# Patient Record
Sex: MALE | Race: WHITE | Employment: UNEMPLOYED | URBAN - METROPOLITAN AREA
[De-identification: names, ages, dates, MRNs, and addresses within clinical notes are randomized per-mention and may not be internally consistent; named-entity substitution may affect disease eponyms.]

---

## 2020-03-26 ENCOUNTER — APPOINTMENT (OUTPATIENT)
Dept: CT IMAGING | Age: 35
DRG: 062 | End: 2020-03-26
Payer: COMMERCIAL

## 2020-03-26 ENCOUNTER — HOSPITAL ENCOUNTER (INPATIENT)
Age: 35
LOS: 1 days | Discharge: HOME | DRG: 062 | End: 2020-03-27
Attending: EMERGENCY MEDICINE | Admitting: INTERNAL MEDICINE
Payer: COMMERCIAL

## 2020-03-26 ENCOUNTER — APPOINTMENT (OUTPATIENT)
Dept: MRI IMAGING | Age: 35
DRG: 062 | End: 2020-03-26
Payer: COMMERCIAL

## 2020-03-26 PROBLEM — I63.9 STROKE ABORTED BY ADMINISTRATION OF THROMBOLYTIC AGENT (HCC): Status: ACTIVE | Noted: 2020-03-26

## 2020-03-26 LAB
ALBUMIN SERPL-MCNC: 4.6 G/DL (ref 3.4–5)
ALBUMIN SERPL-MCNC: 4.8 G/DL (ref 3.4–5)
ALP BLD-CCNC: 92 U/L (ref 40–129)
ALT SERPL-CCNC: 78 U/L (ref 10–40)
AMPHETAMINE SCREEN, URINE: POSITIVE
ANION GAP SERPL CALCULATED.3IONS-SCNC: 18 MMOL/L (ref 3–16)
ANION GAP SERPL CALCULATED.3IONS-SCNC: 21 MMOL/L (ref 3–16)
AST SERPL-CCNC: 69 U/L (ref 15–37)
BARBITURATE SCREEN URINE: ABNORMAL
BASOPHILS ABSOLUTE: 0.1 K/UL (ref 0–0.2)
BASOPHILS RELATIVE PERCENT: 0.8 %
BENZODIAZEPINE SCREEN, URINE: ABNORMAL
BILIRUB SERPL-MCNC: 0.8 MG/DL (ref 0–1)
BILIRUBIN DIRECT: <0.2 MG/DL (ref 0–0.3)
BILIRUBIN, INDIRECT: ABNORMAL MG/DL (ref 0–1)
BUN BLDV-MCNC: 10 MG/DL (ref 7–20)
BUN BLDV-MCNC: 10 MG/DL (ref 7–20)
CALCIUM SERPL-MCNC: 9.5 MG/DL (ref 8.3–10.6)
CALCIUM SERPL-MCNC: 9.9 MG/DL (ref 8.3–10.6)
CANNABINOID SCREEN URINE: ABNORMAL
CHLORIDE BLD-SCNC: 101 MMOL/L (ref 99–110)
CHLORIDE BLD-SCNC: 104 MMOL/L (ref 99–110)
CHOLESTEROL, TOTAL: 139 MG/DL (ref 0–199)
CO2: 16 MMOL/L (ref 21–32)
CO2: 19 MMOL/L (ref 21–32)
COCAINE METABOLITE SCREEN URINE: ABNORMAL
CREAT SERPL-MCNC: 0.9 MG/DL (ref 0.9–1.3)
CREAT SERPL-MCNC: 0.9 MG/DL (ref 0.9–1.3)
EKG ATRIAL RATE: 187 BPM
EKG DIAGNOSIS: NORMAL
EKG Q-T INTERVAL: 252 MS
EKG QRS DURATION: 70 MS
EKG QTC CALCULATION (BAZETT): 439 MS
EKG R AXIS: 81 DEGREES
EKG T AXIS: 58 DEGREES
EKG VENTRICULAR RATE: 183 BPM
EOSINOPHILS ABSOLUTE: 0.1 K/UL (ref 0–0.6)
EOSINOPHILS RELATIVE PERCENT: 1.2 %
ESTIMATED AVERAGE GLUCOSE: 91.1 MG/DL
ETHANOL: 99 MG/DL (ref 0–0.08)
GFR AFRICAN AMERICAN: >60
GFR AFRICAN AMERICAN: >60
GFR NON-AFRICAN AMERICAN: >60
GFR NON-AFRICAN AMERICAN: >60
GLUCOSE BLD-MCNC: 94 MG/DL (ref 70–99)
GLUCOSE BLD-MCNC: 95 MG/DL (ref 70–99)
GLUCOSE BLD-MCNC: 95 MG/DL (ref 70–99)
HBA1C MFR BLD: 4.8 %
HCT VFR BLD CALC: 50.6 % (ref 40.5–52.5)
HDLC SERPL-MCNC: 42 MG/DL (ref 40–60)
HEMOGLOBIN: 17.2 G/DL (ref 13.5–17.5)
INR BLD: 1.2 (ref 0.86–1.14)
LDL CHOLESTEROL CALCULATED: 79 MG/DL
LV EF: 50 %
LVEF MODALITY: NORMAL
LYMPHOCYTES ABSOLUTE: 2.6 K/UL (ref 1–5.1)
LYMPHOCYTES RELATIVE PERCENT: 27.4 %
Lab: ABNORMAL
MCH RBC QN AUTO: 35.1 PG (ref 26–34)
MCHC RBC AUTO-ENTMCNC: 34.1 G/DL (ref 31–36)
MCV RBC AUTO: 102.9 FL (ref 80–100)
METHADONE SCREEN, URINE: ABNORMAL
MONOCYTES ABSOLUTE: 0.8 K/UL (ref 0–1.3)
MONOCYTES RELATIVE PERCENT: 8.3 %
NEUTROPHILS ABSOLUTE: 5.8 K/UL (ref 1.7–7.7)
NEUTROPHILS RELATIVE PERCENT: 62.3 %
OPIATE SCREEN URINE: ABNORMAL
OXYCODONE URINE: ABNORMAL
PDW BLD-RTO: 12.5 % (ref 12.4–15.4)
PERFORMED ON: NORMAL
PH UA: 5
PHENCYCLIDINE SCREEN URINE: ABNORMAL
PHOSPHORUS: 4.2 MG/DL (ref 2.5–4.9)
PLATELET # BLD: 249 K/UL (ref 135–450)
PMV BLD AUTO: 8.6 FL (ref 5–10.5)
POTASSIUM SERPL-SCNC: 3.8 MMOL/L (ref 3.5–5.1)
POTASSIUM SERPL-SCNC: 4.2 MMOL/L (ref 3.5–5.1)
PROPOXYPHENE SCREEN: ABNORMAL
PROTHROMBIN TIME: 13.9 SEC (ref 10–13.2)
RBC # BLD: 4.92 M/UL (ref 4.2–5.9)
SODIUM BLD-SCNC: 138 MMOL/L (ref 136–145)
SODIUM BLD-SCNC: 141 MMOL/L (ref 136–145)
TOTAL PROTEIN: 7.3 G/DL (ref 6.4–8.2)
TRIGL SERPL-MCNC: 90 MG/DL (ref 0–150)
TROPONIN: <0.01 NG/ML
TSH REFLEX: 1.75 UIU/ML (ref 0.27–4.2)
VLDLC SERPL CALC-MCNC: 18 MG/DL
WBC # BLD: 9.3 K/UL (ref 4–11)

## 2020-03-26 PROCEDURE — 6360000002 HC RX W HCPCS: Performed by: EMERGENCY MEDICINE

## 2020-03-26 PROCEDURE — 92610 EVALUATE SWALLOWING FUNCTION: CPT

## 2020-03-26 PROCEDURE — 99254 IP/OBS CNSLTJ NEW/EST MOD 60: CPT | Performed by: INTERNAL MEDICINE

## 2020-03-26 PROCEDURE — 6360000002 HC RX W HCPCS: Performed by: STUDENT IN AN ORGANIZED HEALTH CARE EDUCATION/TRAINING PROGRAM

## 2020-03-26 PROCEDURE — 6360000004 HC RX CONTRAST MEDICATION: Performed by: EMERGENCY MEDICINE

## 2020-03-26 PROCEDURE — 94150 VITAL CAPACITY TEST: CPT

## 2020-03-26 PROCEDURE — 85610 PROTHROMBIN TIME: CPT

## 2020-03-26 PROCEDURE — 84484 ASSAY OF TROPONIN QUANT: CPT

## 2020-03-26 PROCEDURE — 84443 ASSAY THYROID STIM HORMONE: CPT

## 2020-03-26 PROCEDURE — 3E03317 INTRODUCTION OF OTHER THROMBOLYTIC INTO PERIPHERAL VEIN, PERCUTANEOUS APPROACH: ICD-10-PCS | Performed by: STUDENT IN AN ORGANIZED HEALTH CARE EDUCATION/TRAINING PROGRAM

## 2020-03-26 PROCEDURE — 92523 SPEECH SOUND LANG COMPREHEN: CPT

## 2020-03-26 PROCEDURE — 70551 MRI BRAIN STEM W/O DYE: CPT

## 2020-03-26 PROCEDURE — 6360000002 HC RX W HCPCS

## 2020-03-26 PROCEDURE — 2580000003 HC RX 258: Performed by: EMERGENCY MEDICINE

## 2020-03-26 PROCEDURE — 2000000000 HC ICU R&B

## 2020-03-26 PROCEDURE — 70496 CT ANGIOGRAPHY HEAD: CPT

## 2020-03-26 PROCEDURE — G0480 DRUG TEST DEF 1-7 CLASSES: HCPCS

## 2020-03-26 PROCEDURE — 99285 EMERGENCY DEPT VISIT HI MDM: CPT

## 2020-03-26 PROCEDURE — 80307 DRUG TEST PRSMV CHEM ANLYZR: CPT

## 2020-03-26 PROCEDURE — 2580000003 HC RX 258: Performed by: STUDENT IN AN ORGANIZED HEALTH CARE EDUCATION/TRAINING PROGRAM

## 2020-03-26 PROCEDURE — 80069 RENAL FUNCTION PANEL: CPT

## 2020-03-26 PROCEDURE — 6370000000 HC RX 637 (ALT 250 FOR IP): Performed by: INTERNAL MEDICINE

## 2020-03-26 PROCEDURE — 6370000000 HC RX 637 (ALT 250 FOR IP): Performed by: STUDENT IN AN ORGANIZED HEALTH CARE EDUCATION/TRAINING PROGRAM

## 2020-03-26 PROCEDURE — 93005 ELECTROCARDIOGRAM TRACING: CPT | Performed by: EMERGENCY MEDICINE

## 2020-03-26 PROCEDURE — 36415 COLL VENOUS BLD VENIPUNCTURE: CPT

## 2020-03-26 PROCEDURE — 2500000003 HC RX 250 WO HCPCS: Performed by: EMERGENCY MEDICINE

## 2020-03-26 PROCEDURE — C8929 TTE W OR WO FOL WCON,DOPPLER: HCPCS

## 2020-03-26 PROCEDURE — 99223 1ST HOSP IP/OBS HIGH 75: CPT | Performed by: INTERNAL MEDICINE

## 2020-03-26 PROCEDURE — 96375 TX/PRO/DX INJ NEW DRUG ADDON: CPT

## 2020-03-26 PROCEDURE — 83036 HEMOGLOBIN GLYCOSYLATED A1C: CPT

## 2020-03-26 PROCEDURE — 80076 HEPATIC FUNCTION PANEL: CPT

## 2020-03-26 PROCEDURE — 80061 LIPID PANEL: CPT

## 2020-03-26 PROCEDURE — 96365 THER/PROPH/DIAG IV INF INIT: CPT

## 2020-03-26 PROCEDURE — 70450 CT HEAD/BRAIN W/O DYE: CPT

## 2020-03-26 PROCEDURE — 2500000003 HC RX 250 WO HCPCS: Performed by: STUDENT IN AN ORGANIZED HEALTH CARE EDUCATION/TRAINING PROGRAM

## 2020-03-26 PROCEDURE — 85025 COMPLETE CBC W/AUTO DIFF WBC: CPT

## 2020-03-26 RX ORDER — SODIUM CHLORIDE 0.9 % (FLUSH) 0.9 %
10 SYRINGE (ML) INJECTION PRN
Status: DISCONTINUED | OUTPATIENT
Start: 2020-03-26 | End: 2020-03-27 | Stop reason: SDUPTHER

## 2020-03-26 RX ORDER — ONDANSETRON 2 MG/ML
4 INJECTION INTRAMUSCULAR; INTRAVENOUS EVERY 6 HOURS PRN
Status: DISCONTINUED | OUTPATIENT
Start: 2020-03-26 | End: 2020-03-27 | Stop reason: HOSPADM

## 2020-03-26 RX ORDER — POLYETHYLENE GLYCOL 3350 17 G/17G
17 POWDER, FOR SOLUTION ORAL DAILY PRN
Status: DISCONTINUED | OUTPATIENT
Start: 2020-03-26 | End: 2020-03-27 | Stop reason: HOSPADM

## 2020-03-26 RX ORDER — SODIUM CHLORIDE 0.9 % (FLUSH) 0.9 %
10 SYRINGE (ML) INJECTION PRN
Status: DISCONTINUED | OUTPATIENT
Start: 2020-03-26 | End: 2020-03-26 | Stop reason: SDUPTHER

## 2020-03-26 RX ORDER — ASPIRIN 81 MG/1
81 TABLET ORAL DAILY
Status: DISCONTINUED | OUTPATIENT
Start: 2020-03-27 | End: 2020-03-27 | Stop reason: HOSPADM

## 2020-03-26 RX ORDER — SODIUM CHLORIDE 0.9 % (FLUSH) 0.9 %
10 SYRINGE (ML) INJECTION EVERY 12 HOURS SCHEDULED
Status: DISCONTINUED | OUTPATIENT
Start: 2020-03-26 | End: 2020-03-26 | Stop reason: SDUPTHER

## 2020-03-26 RX ORDER — DILTIAZEM HYDROCHLORIDE 120 MG/1
240 CAPSULE, COATED, EXTENDED RELEASE ORAL DAILY
Status: DISCONTINUED | OUTPATIENT
Start: 2020-03-26 | End: 2020-03-27

## 2020-03-26 RX ORDER — ACETAMINOPHEN 325 MG/1
650 TABLET ORAL EVERY 8 HOURS PRN
Status: DISCONTINUED | OUTPATIENT
Start: 2020-03-26 | End: 2020-03-27

## 2020-03-26 RX ORDER — ATORVASTATIN CALCIUM 80 MG/1
80 TABLET, FILM COATED ORAL NIGHTLY
Status: DISCONTINUED | OUTPATIENT
Start: 2020-03-26 | End: 2020-03-27 | Stop reason: HOSPADM

## 2020-03-26 RX ORDER — ESCITALOPRAM OXALATE 20 MG/1
20 TABLET ORAL DAILY
COMMUNITY

## 2020-03-26 RX ORDER — DILTIAZEM HYDROCHLORIDE 5 MG/ML
10 INJECTION INTRAVENOUS ONCE
Status: COMPLETED | OUTPATIENT
Start: 2020-03-26 | End: 2020-03-26

## 2020-03-26 RX ORDER — PROMETHAZINE HYDROCHLORIDE 25 MG/1
12.5 TABLET ORAL EVERY 6 HOURS PRN
Status: DISCONTINUED | OUTPATIENT
Start: 2020-03-26 | End: 2020-03-27 | Stop reason: HOSPADM

## 2020-03-26 RX ORDER — ASPIRIN 300 MG/1
300 SUPPOSITORY RECTAL DAILY
Status: DISCONTINUED | OUTPATIENT
Start: 2020-03-27 | End: 2020-03-27

## 2020-03-26 RX ORDER — DEXTROSE MONOHYDRATE 25 G/50ML
12.5 INJECTION, SOLUTION INTRAVENOUS
Status: ACTIVE | OUTPATIENT
Start: 2020-03-26 | End: 2020-03-26

## 2020-03-26 RX ORDER — DEXTROAMPHETAMINE SACCHARATE, AMPHETAMINE ASPARTATE, DEXTROAMPHETAMINE SULFATE AND AMPHETAMINE SULFATE 3.75; 3.75; 3.75; 3.75 MG/1; MG/1; MG/1; MG/1
15 TABLET ORAL 2 TIMES DAILY
Status: ON HOLD | COMMUNITY
End: 2020-03-27 | Stop reason: HOSPADM

## 2020-03-26 RX ORDER — SODIUM CHLORIDE 0.9 % (FLUSH) 0.9 %
10 SYRINGE (ML) INJECTION EVERY 12 HOURS SCHEDULED
Status: DISCONTINUED | OUTPATIENT
Start: 2020-03-26 | End: 2020-03-27 | Stop reason: SDUPTHER

## 2020-03-26 RX ORDER — SODIUM CHLORIDE, SODIUM LACTATE, POTASSIUM CHLORIDE, CALCIUM CHLORIDE 600; 310; 30; 20 MG/100ML; MG/100ML; MG/100ML; MG/100ML
INJECTION, SOLUTION INTRAVENOUS CONTINUOUS
Status: DISCONTINUED | OUTPATIENT
Start: 2020-03-26 | End: 2020-03-26

## 2020-03-26 RX ORDER — 0.9 % SODIUM CHLORIDE 0.9 %
50 INTRAVENOUS SOLUTION INTRAVENOUS ONCE
Status: COMPLETED | OUTPATIENT
Start: 2020-03-26 | End: 2020-03-26

## 2020-03-26 RX ORDER — DEXTROAMPHETAMINE SACCHARATE, AMPHETAMINE ASPARTATE, DEXTROAMPHETAMINE SULFATE AND AMPHETAMINE SULFATE 3.75; 3.75; 3.75; 3.75 MG/1; MG/1; MG/1; MG/1
15 TABLET ORAL 2 TIMES DAILY
Status: DISCONTINUED | OUTPATIENT
Start: 2020-03-26 | End: 2020-03-26

## 2020-03-26 RX ORDER — 0.9 % SODIUM CHLORIDE 0.9 %
1000 INTRAVENOUS SOLUTION INTRAVENOUS ONCE
Status: COMPLETED | OUTPATIENT
Start: 2020-03-26 | End: 2020-03-26

## 2020-03-26 RX ORDER — ESCITALOPRAM OXALATE 20 MG/1
20 TABLET ORAL DAILY
Status: DISCONTINUED | OUTPATIENT
Start: 2020-03-26 | End: 2020-03-27 | Stop reason: HOSPADM

## 2020-03-26 RX ADMIN — ALTEPLASE 69.8 MG: KIT at 01:11

## 2020-03-26 RX ADMIN — ATORVASTATIN CALCIUM 80 MG: 80 TABLET, FILM COATED ORAL at 21:16

## 2020-03-26 RX ADMIN — DILTIAZEM HYDROCHLORIDE 5 MG/HR: 5 INJECTION INTRAVENOUS at 01:38

## 2020-03-26 RX ADMIN — DILTIAZEM HYDROCHLORIDE 240 MG: 120 CAPSULE, COATED, EXTENDED RELEASE ORAL at 16:11

## 2020-03-26 RX ADMIN — DILTIAZEM HYDROCHLORIDE 10 MG/HR: 5 INJECTION INTRAVENOUS at 05:49

## 2020-03-26 RX ADMIN — SODIUM CHLORIDE 50 ML: 900 INJECTION, SOLUTION INTRAVENOUS at 02:03

## 2020-03-26 RX ADMIN — ESCITALOPRAM OXALATE 20 MG: 20 TABLET ORAL at 10:22

## 2020-03-26 RX ADMIN — SODIUM CHLORIDE, POTASSIUM CHLORIDE, SODIUM LACTATE AND CALCIUM CHLORIDE: 600; 310; 30; 20 INJECTION, SOLUTION INTRAVENOUS at 05:49

## 2020-03-26 RX ADMIN — SODIUM CHLORIDE 1000 ML: 0.9 INJECTION, SOLUTION INTRAVENOUS at 00:45

## 2020-03-26 RX ADMIN — IOPAMIDOL 80 ML: 755 INJECTION, SOLUTION INTRAVENOUS at 00:36

## 2020-03-26 RX ADMIN — Medication 10 ML: at 10:22

## 2020-03-26 RX ADMIN — Medication 10 ML: at 21:16

## 2020-03-26 RX ADMIN — DILTIAZEM HYDROCHLORIDE 15 MG/HR: 5 INJECTION INTRAVENOUS at 14:15

## 2020-03-26 RX ADMIN — ALTEPLASE 7.8 MG: KIT at 01:11

## 2020-03-26 RX ADMIN — FOLIC ACID: 5 INJECTION, SOLUTION INTRAMUSCULAR; INTRAVENOUS; SUBCUTANEOUS at 05:48

## 2020-03-26 RX ADMIN — ACETAMINOPHEN 650 MG: 325 TABLET ORAL at 18:56

## 2020-03-26 RX ADMIN — DILTIAZEM HYDROCHLORIDE 10 MG: 5 INJECTION INTRAVENOUS at 00:44

## 2020-03-26 ASSESSMENT — PAIN DESCRIPTION - PAIN TYPE
TYPE: ACUTE PAIN
TYPE: ACUTE PAIN

## 2020-03-26 ASSESSMENT — ENCOUNTER SYMPTOMS
EYES NEGATIVE: 1
GASTROINTESTINAL NEGATIVE: 1
RESPIRATORY NEGATIVE: 1
ALLERGIC/IMMUNOLOGIC NEGATIVE: 1

## 2020-03-26 ASSESSMENT — PAIN SCALES - GENERAL
PAINLEVEL_OUTOF10: 3
PAINLEVEL_OUTOF10: 0
PAINLEVEL_OUTOF10: 3

## 2020-03-26 ASSESSMENT — PAIN DESCRIPTION - ONSET: ONSET: GRADUAL

## 2020-03-26 ASSESSMENT — PAIN DESCRIPTION - LOCATION
LOCATION: HEAD
LOCATION: SACRUM

## 2020-03-26 ASSESSMENT — PAIN - FUNCTIONAL ASSESSMENT: PAIN_FUNCTIONAL_ASSESSMENT: ACTIVITIES ARE NOT PREVENTED

## 2020-03-26 ASSESSMENT — PAIN DESCRIPTION - FREQUENCY: FREQUENCY: INTERMITTENT

## 2020-03-26 ASSESSMENT — PAIN DESCRIPTION - DESCRIPTORS: DESCRIPTORS: ACHING;DULL;DISCOMFORT

## 2020-03-26 ASSESSMENT — PAIN DESCRIPTION - PROGRESSION: CLINICAL_PROGRESSION: NOT CHANGED

## 2020-03-26 ASSESSMENT — PAIN DESCRIPTION - ORIENTATION: ORIENTATION: MID;POSTERIOR

## 2020-03-26 NOTE — ED PROVIDER NOTES
(cerebrovascular accident) ([facility] [address]    2. Expressive aphasia    3. Atrial fibrillation with rapid ventricular response (HCC)        Disposition     PATIENT REFERRED TO:  No follow-up provider specified.     DISCHARGE MEDICATIONS:  New Prescriptions    No medications on file       Fabrizio Wilson MD  03/26/20 8586

## 2020-03-26 NOTE — ED NOTES
Patient reports last seen normal is 2300, patient was still able to text significant other and family, ambulate to room without difficultly and perform other tasks without deficit.       April López RN  03/26/20 1657

## 2020-03-26 NOTE — PROGRESS NOTES
Speech Language Pathology  Facility/Department: [facility]  Initial Speech/Language/Cognitive Assessment    NAME: Alissa Jim  : 1985   MRN: [id number]  ADMISSION DATE: 3/26/2020  ADMITTING DIAGNOSIS: has Stroke aborted by administration of thrombolytic agent ([address] on their problem list.  DATE ONSET: 3/26/2020    Date of Eval: 3/26/2020   Evaluating Therapist: AIDAN Angeles    CT of head 3/26/2020  No acute intracranial findings.     There are areas of scattered ovoid lobulated sclerotic lesions in the    calvarium most prominently right occipital bone measuring 1.7 cm which    are indeterminate.  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     Osteoblastic metastatic disease is difficult to exclude.          Primary Complaint: pt indicates frustration regarding his speech    Pain:  Pain Assessment  Pain Assessment: denies    Assessment:  pt presenting with mod-sev apraxia of speech. Comprehension is intact. When attempting to complete diadokinetic tasks,  rote speech tasks, naming objects or completing sentences pt exhibits difficulty initiating speech with inconsistent articulatory errors. Reading comprehension is WFL's. Pt left hand dominant and is using writing effectively to communicate. As writing is fairly functional, less likely that this is aphasia as language deficits are not apparent in written material.       Recommendations:  Requires SLP Intervention: Yes  Duration/Frequency of Treatment: 3-5 x/week  D/C Recommendations: (ongoing speech/language therapy recommended)     Plan:   Goals:  Pt to be seen to address the following goals:  1-  Pt will utilize 10 high-frequency words via verbal or nonverbal modalities with 60% consistency to improve ability to communicate basic needs with familiar and unfamiliar speakers.   2- pt will repeat single words with 80% accuracy and mod cues  3- pt will participate in further speech/lang/cog assessment as approrpiate  Patient/family involved in developing goals and treatment plan:     Subjective:   Previous level of function and limitations:   General  Chart Reviewed: Yes  Family / Caregiver Present: No     Vision  Vision: Within Functional Limits - pt writes, last night it was blurry, now it is fine  Hearing  Hearing: Within functional limits         Objective:     Oral/Motor  Oral Motor: Within functional limits    Auditory Comprehension  Comprehension: Within Functional Limits    Expression:   Pt exhibiting difficulty communicating verbally however appears to be related to apraxia vs aphasia. Motor Speech  Motor Speech: Exceptions to [facility]  Apraxia: Moderate    Reading:  reading comprehension intact, reading orally affected by apraxia    Writing:  Pt is left hand dominant. Pt using writing at this time to communicate. As writing is fairly functional, less likely that this is aphasia as language deficits are not apparent in written material.     Cognition:   Pt is oriented fully. Cognition appears intact through informal measure, however not able to formally assess due to communication impairment       Prognosis:  Speech Therapy Prognosis  Prognosis: Fair  Individuals consulted  Consulted and agree with results and recommendations: Patient;RN    Education:  Patient Education: pt educated  to purpose of visit  Patient Education Response: Verbalizes understanding  Safety Devices in place: Yes  Type of devices: Call light within reach    Therapy Time:   Individual Concurrent Group Co-treatment   Time In 0852         Time Out 0910         Minutes 18               Speech/language therapy 3-5 x/week  Dc recommendation: ongoing treatment is indicated upon dc from acute services      Timmy Denton M.S./[facility] #[id number]  Pg.  # [id number]  Needs met prior to leaving room, call light within reach  This document will serve as a dc summary if pt dc prior to next visit    3/26/2020 9:52 AM

## 2020-03-26 NOTE — H&P
Internal Medicine PGY- 2 Resident History & Physical intensive care unit      PCP: No primary care provider on file. Date of Admission: 3/26/2020    Chief Complaint: Expressive aphasia    HPI:     Patient is a 50-year-old male with past medical history of alcoholism. Presented to the emergency department for difficulty speaking that started approximately between 7pm - 11 pm before presentation. Prior to presentation to the emergency department Mike Bejarano arrived home from work and found him struggling with communicating. Otherwise there was no difficulty with movement of his extremities or sensation. There was no loss of consciousness there were no history of trauma. He was however drinking prior to presentation unsure of the quantity. He does have a history of chronic alcoholism for which the exact scale is uncertain but significant other reports three quarters to a bottle of hard liquor daily. On admission his alcohol level was 95. Otherwise he does have a history of increased heart rate and palpitations over the last couple of weeks. On talking with the girlfriend she reports that he has been feeling increased fatigue and shortness of breath with minimal activity. She does endorse that he has a very sedentary lifestyle. No recent report of travel. No known history of DVT or thromboembolism in family. No history of early cardiac death reported. Otherwise she reports the only other symptom he was having was left inguinal lymphadenopathy with resolution. History obtained from chart review and domestic partner. No reports of nausea, vomiting, abdominal pain, penile discharge, fever or chills. Sleep apnea reported during sleep study.      Past Medical History:          Diagnosis Date    Alcoholism [facility])     Atrial fibrillation ([facility] [address]        PastSurgical History:      None reported    Medications Prior to Admission:      Adderall 15 bid  Citalopram 20 mg      Allergies: Patient has no known allergies. Social History:      TOBACCO:  Half a pack a day  ETOH: Chronic history of alcohol use indeterminate amount      Family History:     No sudden cardiac death  Mother had stroke in late 45s     PHYSICAL EXAM PERFORMED:    BP (!) 124/98   Pulse 112   Temp 97.8 °F (36.6 °C) (Oral)   Resp 29   Wt 190 lb (86.2 kg)   SpO2 96%     Physical Exam  Constitutional:       General: He is not in acute distress. Appearance: He is well-developed. He is ill-appearing. HENT:      Head: Normocephalic and atraumatic. Mouth/Throat:      Mouth: Mucous membranes are dry. Eyes:      Pupils: Pupils are equal, round, and reactive to light. Neck:      Musculoskeletal: Normal range of motion and neck supple. Cardiovascular:      Rate and Rhythm: Normal rate and regular rhythm. Heart sounds: Normal heart sounds. No murmur. No friction rub. No gallop. Pulmonary:      Effort: Pulmonary effort is normal. No respiratory distress. Breath sounds: Normal breath sounds. Abdominal:      General: Bowel sounds are normal.      Palpations: Abdomen is soft. Musculoskeletal: Normal range of motion. General: No tenderness. Neurological:      Mental Status: He is alert. Cranial Nerves: No cranial nerve deficit. Sensory: No sensory deficit. Motor: No weakness. Coordination: Coordination normal.      Gait: Gait normal.      Deep Tendon Reflexes: Reflexes normal.      Comments: Expressive aphasia normal strength in all 4 extremities. Able to communicate with writing. Labs:     Recent Labs     03/26/20 0014   WBC 9.3   HGB 17.2   HCT 50.6        Recent Labs     03/26/20 0014      K 3.8      CO2 19*   BUN 10   CREATININE 0.9   CALCIUM 9.5     No results for input(s): AST, ALT, BILIDIR, BILITOT, ALKPHOS in the last 72 hours. No results for input(s): INR in the last 72 hours.   Recent Labs     03/26/20 0014   TROPONINI <0.01       Urinalysis:    No results

## 2020-03-26 NOTE — CONSULTS
Clinical Pharmacy Consult Note    27 y.o. male admitted with cerebrovascular accident. Pharmacy has been asked by Dr. Benito Kay to adjust all drips to normal saline as appropriate based on compatibility to avoid fluid shifts since D5 is osmotically active. The following intermittent IV drips/infusions have been adjusted to saline:  Diltiazem IV     The following medications must remain in D5W due to incompatibility with normal saline:  Amphotericin  Epinephrine  Mycophenolate  Nitroprusside  Penicillin G Potassium    Please be aware that patient has D5W ordered as part of hypoglycemia orderset. [facility] will follow daily to ensure all new IVPBs + drips are in NS. Please call with questions. Please call pharmacy with any questions!    [place], [address]

## 2020-03-26 NOTE — CONSULTS
[address]   Cardiac Electrophysiology Consultation   Date: 3/26/2020  Admit Date:  3/26/2020  Reason for Consultation: Atrial fibrillation  Consult Requesting Physician: Tigist Copeland MD     Chief Complaint   Patient presents with    Cerebrovascular Accident     HPI: Sam Smyth is a 27 y.o. gentleman with no past medical history apart from alcoholism, was admitted to the hospital secondary to expressive aphasia. Due to concerns of stroke, patient received TPA. He was also noted to have atrial fibrillation with poorly controlled ventricular rate. Hence EP was consulted for further evaluation. Patient endorses having palpitations, shortness of breath and occasional lightheadedness for the past 3 weeks or so. He denies any history of chest pain. He denies any known history of paroxysmal atrial fibrillation. [place] any recreational drug use. Past Medical History:   Diagnosis Date    Alcoholism [facility])     Atrial fibrillation ([facility] [address]         No past surgical history on file. No Known Allergies    Social History:  Reviewed. Family History:  Reviewed. family history is not on file. No premature CAD. Review of System:  All other systems reviewed except for that noted above. Pertinent negatives and positives are:     Objective      · General: negative for fever, chills   · Ophthalmic ROS: negative for - eye pain or loss of vision  · ENT ROS: negative for - headaches, sore throat   · Respiratory: negative for - cough, sputum  · Cardiovascular: Reviewed in HPI  · Gastrointestinal: negative for - abdominal pain, diarrhea, N/V  · Hematology: negative for - bleeding, blood clots, bruising or jaundice  · Genito-Urinary:  negative for - Dysuria or incontinence  · Musculoskeletal: negative for - Joint swelling, muscle pain  · Neurological: negative for - confusion, dizziness, headaches   · Psychiatric: No anxiety, no depression.   · Dermatological: negative for - rash    Physical Examination:  Vitals:    20 1300   BP: 121/86   Pulse: 97   Resp: 15   Temp:    SpO2: 91%        Intake/Output Summary (Last 24 hours) at 3/26/2020 1434  Last data filed at 3/26/2020 1208  Gross per 24 hour   Intake 626 ml   Output 2350 ml   Net -1724 ml     In: 626 [P.O.:480; I.V.:146]  Out: 2350    Wt Readings from Last 3 Encounters:   20 189 lb 2.5 oz (85.8 kg)     Temp  Av.9 °F (36.6 °C)  Min: 97.7 °F (36.5 °C)  Max: 98 °F (36.7 °C)  Pulse  Av.8  Min: 70  Max: 193  BP  Min: 100/73  Max: 138/110  SpO2  Av.1 %  Min: 91 %  Max: 100 %    · Telemetry: Atrial fibrillation  · Constitutional: Alert. Oriented to person, place, and time. No distress. · Head: Normocephalic and atraumatic. · Mouth/Throat: Lips appear moist. Oropharynx is clear and moist.  · Eyes: Conjunctivae normal. EOM are normal.   · Neck: Neck supple. No lymphadenopathy. No rigidity. No JVD present. · Cardiovascular: Normal rate, regular rhythm. Normal S1&S2. Carotid pulse 2+ bilaterally. · Pulmonary/Chest: Bilateral respiratory sounds present. No respiratory accessory muscle use. No wheezes, No rhonchi. · Abdominal: Soft. Normal bowel sounds present. No distension, No tenderness. No splenomegaly. No hernia. · Musculoskeletal: No tenderness. No edema    · Lymphadenopathy: Has no cervical adenopathy. · Neurological: Alert and oriented. Cranial nerve II-XII grossly intact, No gross deficit to touch. · Skin: Skin is warm and dry. No rash, lesions, ulcerations noted. · Psychiatric: No anxiety nor agitation. Labs:  Reviewed.    Recent Labs     20  0014 20  1029    138   K 3.8 4.2    101   CO2 19* 16*   PHOS  --  4.2   BUN 10 10   CREATININE 0.9 0.9     Recent Labs     20  0014   WBC 9.3   HGB 17.2   HCT 50.6   .9*        Lab Results   Component Value Date    TROPONINI <0.01 2020     No results found for: BNP  Lab Results   Component Value Date    PROTIME 13.9

## 2020-03-26 NOTE — CONSULTS
Neurology consult Note      Patient: Ruth Porter MRN: [id number]    YOB: 1985  Age: 27 y.o. Sex: male   Unit: [facility] ICU TOWER Room/Bed: 4512/4512-01 Location: [address] [place] [address]    Date of Consultation: 3/26/2020  Date of Admission: 3/26/2020 12:09 AM ( LOS: 0 days )  Admitting Physician: Eduardo Santos    Primary Care Physician: No primary care provider on file. Consult Requested By: JOHN Thomas     Reason for Consult: stroke post TPA    Chief Complaint:   Aphasia    History of Present Illness:  Ruth Porter is a 27 y.o. male with PHx sig for ETOH abuse and atrial fibrillation who presented with speech difficulties. History is obtained from both review of records and the patient. According to records the patient himself indicated to ED physician he started experiencing speech difficulties approximately 2 hours prior to arrival to the emergency room. His girlfriend had last seen him normal approximately 9 hours and 45 minutes prior. He  is unsure what would have precipitated these symptoms, other than the above. He denies any other associated symptoms including no HA, no swallowing difficulties, no visual changes, no diplopia, no hearing loss, no tinnitus, no vertigo, no imbalance, no light-headedness, no focal weakness, no sensory changes, no nausea or vomiting. He  has never  had this problem before. Upon evaluation in the ED a CT scan of the head was obtained which showed no acute intracranial abnormality. CT Angio of the head and neck was negative for any large vessel occlusion or significant stenosis. His initial NIHSS was 2 .     [facility] stroke team consulted and it was advised he receive t-PA. He was administered thrombolytic therapy approximately 3 hours after onset of symptoms. While in the ED his EKG did show atrial fibrillation with rapid ventricular response and he was subsequently started on Cardizem drip for rate control.     When asked if he has a history of atrial fibrillation he states he had been having heart issues for the last 2 weeks but unable to explain a diagnosis. He admits to smoking less than half a pack a day smokes pack a day and drinks approximately 8 beers a day. He denies any illicit drug use. And asked if he was taking any medications such as aspirin or statin at home he  wrote the only medication he takes at home is Tylenol. He remains in the ICU with expressive aphasia at this time. Past Medical History:   has a past medical history of Alcoholism ([facility] [address] and Atrial fibrillation ([facility] [address] Patient Active Problem List   Diagnosis    Stroke aborted by administration of thrombolytic agent [facility])       Past Surgical History:  No past surgical history on file. Family History:  family history is not on file.     Social History:  he     Social History     Socioeconomic History    Marital status: Single     Spouse name: Not on file    Number of children: Not on file    Years of education: Not on file    Highest education level: Not on file   Occupational History    Not on file   Social Needs    Financial resource strain: Not on file    Food insecurity     Worry: Not on file     Inability: Not on file    Transportation needs     Medical: Not on file     Non-medical: Not on file   Tobacco Use    Smoking status: Not on file   Substance and Sexual Activity    Alcohol use: Not on file    Drug use: Not on file    Sexual activity: Not on file   Lifestyle    Physical activity     Days per week: Not on file     Minutes per session: Not on file    Stress: Not on file   Relationships    Social connections     Talks on phone: Not on file     Gets together: Not on file     Attends Synagogue service: Not on file     Active member of club or organization: Not on file     Attends meetings of clubs or organizations: Not on file     Relationship status: Not on file    Intimate partner violence     Fear of current or ex partner: Not on file     Emotionally abused: Not on file     Physically abused: Not on file     Forced sexual activity: Not on file   Other Topics Concern    Not on file   Social History Narrative    Not on file       Medications:  No Known Allergies    No medications prior to admission. Review of Systems:  ROS: Nodded yes or no   Constitutional- No weight loss or fevers  Eyes- No diplopia. No photophobia. Ears/nose/throat- No dysphagia. No Dysarthria  Cardiovascular- No palpitations. No chest pain  Respiratory- No dyspnea. No Cough  Gastrointestinal- No Abdominal pain. No Vomiting. Genitourinary- No incontinence. No urinary retention  Musculoskeletal- No myalgia. No arthralgia  Skin- No rash. No easy bruising. Psychiatric- No depression. No anxiety  Endocrine- No diabetes. No thyroid issues. Hematologic- No bleeding difficulty. No fatigue  Neurologic- as per HPI. OBJECTIVE     Neurological Exam:  Exam:  Blood pressure 120/89, pulse 75, temperature 97.7 °F (36.5 °C), temperature source Oral, resp. rate 14, height 6' (1.829 m), weight 189 lb 2.5 oz (85.8 kg), SpO2 97 %. Constitutional    Vital signs: BP, HR, and RR reviewed   General Alert, no distress, well-nourished  Eyes: fundoscopic exam not visualized. Cardiovascular: pulses symmetric in all 4 extremities. No peripheral edema. Psychiatric: cooperative with examination, no  psychotic behavior noted. Neurologic  Mental status:   Limited exam due to aphasia. orientation limited due to aphasia     General fund of knowledge limited due to aphasia    Memory limited due to aphasia    Attention does  attend to exam well     Language expressive aphasia    Comprehension intact; follows simple commands. Able to write thoughts on paper.    Cranial nerves:   CN2: Visual Fields full w/o extinction on confrontational testing,   CN 3,4,6: extraocular muscles intact,  CN5: facial sensation symmetric   CN7:face symmetric without dysarthria,   CN8: hearing grossly intact  CN9: palate elevated symmetrically  CN11: trap full strength on shoulder shrug  CN12: tongue midline with protrusion  Strength: No pronator drift. Good strength in all 4 extremities   Deep tendon reflexes: normal in all 4 extremities  Sensory: light touch intact in all 4 extremities. No sensory extinction on double simultaneous stimulation  Cerebellar/coordination: finger nose finger normal without ataxia  Tone: normal in all 4 extremities  Gait: deferred due to safety concerns. Imaging:    CTA HEAD NECK W CONTRAST   Final Result     No acute CTA findings in the brain.       _______________________________________________       IMPRESSION:          No acute CTA findings in the neck. CT HEAD WO CONTRAST   Final Result     No acute intracranial findings. There are areas of scattered ovoid lobulated sclerotic lesions in the    calvarium most prominently right occipital bone measuring 1.7 cm which    are indeterminate. 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 Osteoblastic metastatic disease is difficult to exclude. CT head without contrast    (Results Pending)   MRI BRAIN WO CONTRAST    (Results Pending)         All reports below personally reviewed & actual images reviewed where indicated. Pertinent positives & negatives are addressed in Assessment & Plan section of note      Laboratory Review: All results below personally reviewed.  Pertinent positives & negatives are addressed in Assessment & Plan section of note  Recent Results (from the past 36 hour(s))   POCT Glucose    Collection Time: 03/26/20 12:10 AM   Result Value Ref Range    POC Glucose 95 70 - 99 mg/dl    Performed on ACCU-CHEK    CBC auto differential    Collection Time: 03/26/20 12:14 AM   Result Value Ref Range    WBC 9.3 4.0 - 11.0 K/uL    RBC 4.92 4.20 - 5.90 M/uL    Hemoglobin 17.2 13.5 - 17.5 g/dL    Hematocrit 50.6 40.5 - 52.5 %    .9 (H) 80.0 - 100.0 fL    MCH 35.1 (H) 26.0 - 34.0 pg    MCHC 34.1 31.0 - 36.0 g/dL    RDW 12.5 12.4 - 15.4 %    Platelets 386 552 - 374 K/uL    MPV 8.6 5.0 - 10.5 fL    Neutrophils % 62.3 %    Lymphocytes % 27.4 %    Monocytes % 8.3 %    Eosinophils % 1.2 %    Basophils % 0.8 %    Neutrophils Absolute 5.8 1.7 - 7.7 K/uL    Lymphocytes Absolute 2.6 1.0 - 5.1 K/uL    Monocytes Absolute 0.8 0.0 - 1.3 K/uL    Eosinophils Absolute 0.1 0.0 - 0.6 K/uL    Basophils Absolute 0.1 0.0 - 0.2 K/uL   Basic Metabolic Panel (EP - 1)    Collection Time: 03/26/20 12:14 AM   Result Value Ref Range    Sodium 141 136 - 145 mmol/L    Potassium 3.8 3.5 - 5.1 mmol/L    Chloride 104 99 - 110 mmol/L    CO2 19 (L) 21 - 32 mmol/L    Anion Gap 18 (H) 3 - 16    Glucose 95 70 - 99 mg/dL    BUN 10 7 - 20 mg/dL    CREATININE 0.9 0.9 - 1.3 mg/dL    GFR Non-African American >60 >60    GFR African American >60 >60    Calcium 9.5 8.3 - 10.6 mg/dL   Ethanol    Collection Time: 03/26/20 12:14 AM   Result Value Ref Range    Ethanol Lvl 99 mg/dL   Troponin    Collection Time: 03/26/20 12:14 AM   Result Value Ref Range    Troponin <0.01 <0.01 ng/mL   EKG 12 Lead    Collection Time: 03/26/20 12:14 AM   Result Value Ref Range    Ventricular Rate 183 BPM    Atrial Rate 187 BPM    QRS Duration 70 ms    Q-T Interval 252 ms    QTc Calculation (Bazett) 439 ms    R Axis 81 degrees    T Axis 58 degrees    Diagnosis       EKG performed in ER and to be interpreted by ER physician. Confirmed by MD, ER ([id number]),  Jeff Urban ([id number]) on 3/26/2020 5:56:56 AM       Scheduled Meds:   sodium chloride flush  10 mL Intravenous 2 times per day    [START ON 3/27/2020] enoxaparin  40 mg Subcutaneous Daily    [START ON 3/27/2020] aspirin  81 mg Oral Daily    Or    [START ON 3/27/2020] aspirin  300 mg Rectal Daily    atorvastatin  80 mg Oral Nightly       Continuous Infusions:  dilTIAZem, Last Rate: 10 mg/hr (03/26/20 0549)  lactated ringers, Last Rate: 125 mL/hr at 03/26/20 0549           ASSESSMENT & RECOMMENDATIONS:   Ryan Sarmiento, Mykel Bleacher 26 y/o male who presented with expressive aphasia s/p TPA in the setting of atrial fibrillation. 1. Acute cerebral infarction  2. Atrial fibrillation  3. ETOH abuse   4. Tobacco use     ==============  RECOMMENDATIONS:  -Obtain MRI of the brain wo contrast to eval for stroke  -CT head 24 hr s/p TPA to assess for bleed  -Echocardiogram with bubble  -Cardiology consult. He may require anticoagulation for secondary stroke prevention.   -Alcohol withdrawal precautions  -MVI, folic acid 1 mg and Thiamine 100 mg daily.   -Nursing bedside swallow before PO   -ASA 81mg PO daily (once CT head obtained)   -Atorvastatin 80mg PO QHS  -SCDs for DVT prophylaxis  -PT/OT: eval and treat, PMR consult if rehab appropriate   -ST: eval and treat and dysphagia screen  -Allow BP to autoregulate for first 24 hours after stroke and treat BP >220/110 with labetalol   -Q4H neuro checks  -Q4H vital signs  -Check lipid panel, urine drug screen  and hemoglobin A1C  -Telemetry   -Advised and instructed on smoking cessation.   -Further workup of calvarium lesions as per Medical attending. Discussed at length with patient findings and recommendations.        A copy of this note was provided for Dr Casper Lund MD     Electronically signed by:    Femi LOWRY-[address] Neuroscience  [phone]     3/26/2020,7:01 AM

## 2020-03-26 NOTE — PROGRESS NOTES
alcoholic and has been drinking today. She denies him ever any having anything like this in the past.\"     Impression  Pt alert, oriented, follows commands, speech is apraxic. Oral structures grossly intact, no asymmetry noted. Pt able to cough and swallow on command. Presented pt with puree, thin liquids via cup / straw, including 3 ounces of uninterrupted swallows, as well as a jen cracker. Pt demonstrated no overt signs of aspiration: no coughing/throat clearing or change in vocal quality. Good labial seal with no anterior loss of liquids. Good swallow movement noted upon palpation of anterior neck. Pt exhibited no difficulty with mastication of cracker, no oral residue. No c/o globus sensation    Dysphagia Diagnosis: Swallow function appears grossly intact  Dysphagia Outcome Severity Scale: Level 7: Normal in all situations     Treatment Plan  Requires SLP Intervention: Yes  Duration/Frequency of Treatment: 1-2 x  D/C Recommendations: To be determined    Recommended Diet and Intervention  Diet Solids Recommendation: Regular  Liquid Consistency Recommendation: Thin  Recommended Form of Meds: Whole with water     Therapeutic Interventions: Diet tolerance monitoring;Patient/Family education;Oral care    Compensatory Swallowing Strategies  Compensatory Swallowing Strategies: Upright as possible for all oral intake    Treatment/Goals  Pt to be seen to address the following goals:  1-The patient will tolerate recommended diet without observed clinical signs of aspiration  2- The patient Dixie Master will verbalize/demonstrate understanding of dysphagia recommendations  3/26: Educated pt to purpose of visit, s/s of aspiration, concern if aspiration occurs, rationale for diet recommendation/strategies to reduce risk for aspiration and instruction to notify staff if any signs emerge.  Pt demonstrated understanding  Cont goal    General  Chart Reviewed: Yes  Behavior/Cognition: Alert  Respiratory Status: Room

## 2020-03-26 NOTE — ED TRIAGE NOTES
Peripheral IV placed. Positive blood return and flushed with 10 ml sterile saline without difficulty. Patient tolerated procedure well. Site intact with no redness, swelling, or pain at site. Blood specimens drawn after verifying physician order using aseptic technique. Specimen labeled using patient name, date of birth, medical record number as identifiers, and staff initials. Patient tolerated well with dry and sterile dressing intact, no bleeding, bruising or redness present at this time. Patient on stretcher locked in lowest position with side rails up and call light in reach.

## 2020-03-26 NOTE — PROGRESS NOTES
with resolution. History obtained from chart review and domestic partner. No reports of nausea, vomiting, abdominal pain, penile discharge, fever or chills. Sleep apnea reported during sleep study    MEDICATIONS:   Scheduled Meds:   sodium chloride flush  10 mL Intravenous 2 times per day    [START ON 3/27/2020] enoxaparin  40 mg Subcutaneous Daily    [START ON 3/27/2020] aspirin  81 mg Oral Daily    Or    [START ON 3/27/2020] aspirin  300 mg Rectal Daily    atorvastatin  80 mg Oral Nightly    escitalopram  20 mg Oral Daily      Social history  Patient drinks 1/5 of alcohol daily. Smokes half a pack a day of cigarettes    Family history  Mother had stroke in her late 45s    Continuous Infusions:   dilTIAZem 15 mg/hr (03/26/20 1145)     PRN Meds:dextrose, sodium chloride flush, polyethylene glycol, promethazine **OR** ondansetron, perflutren lipid microspheres    Allergies: No Known Allergies  PHYSICAL EXAM:       Vitals:   T-max:  Patient Vitals for the past 8 hrs:   BP Temp Temp src Pulse Resp SpO2   03/26/20 1200 121/87 98 °F (36.7 °C) Oral 97 19 98 %   03/26/20 1130 -- -- -- 84 24 --   03/26/20 1100 129/85 -- -- 120 21 98 %   03/26/20 1030 (!) 138/110 -- -- 75 19 --   03/26/20 1000 118/86 -- -- 87 15 99 %   03/26/20 0930 (!) 118/97 -- -- 80 16 95 %   03/26/20 0900 (!) 124/101 -- -- 88 19 96 %   03/26/20 0830 (!) 115/92 -- -- 99 22 97 %   03/26/20 0800 (!) 124/109 97.9 °F (36.6 °C) Oral 105 18 97 %   03/26/20 0730 128/85 -- -- 101 15 94 %   03/26/20 0700 106/89 -- -- 75 18 95 %   03/26/20 0630 120/89 -- -- 75 14 93 %   03/26/20 0600 (!) 115/95 -- -- 70 16 93 %   03/26/20 0530 (!) 111/93 -- -- 123 19 95 %       Intake/Output Summary (Last 24 hours) at 3/26/2020 1313  Last data filed at 3/26/2020 0804  Gross per 24 hour   Intake 146 ml   Output 1700 ml   Net -1554 ml     I/O this shift: In: 0   Out: 1100 [Urine:1100]  I/O last 3 completed shifts:   In: 146 [I.V.:146]  Out: 600 [Urine:600]  Review of Systems Constitutional: Negative. HENT: Negative. Eyes: Negative. Respiratory: Negative. Cardiovascular: Negative. Gastrointestinal: Negative. Endocrine: Negative. Genitourinary: Negative. Musculoskeletal: Negative. Skin: Negative. Allergic/Immunologic: Negative. Neurological: Positive for speech difficulty. Hematological: Negative. Psychiatric/Behavioral: Negative. Physical Exam  Constitutional:       Appearance: Normal appearance. HENT:      Head: Normocephalic. Nose: Nose normal.      Mouth/Throat:      Mouth: Mucous membranes are dry. Eyes:      Extraocular Movements: Extraocular movements intact. Conjunctiva/sclera: Conjunctivae normal.      Pupils: Pupils are equal, round, and reactive to light. Neck:      Musculoskeletal: Normal range of motion. Cardiovascular:      Rate and Rhythm: Normal rate and regular rhythm. Pulses: Normal pulses. Heart sounds: Normal heart sounds. Pulmonary:      Effort: Pulmonary effort is normal.      Breath sounds: Normal breath sounds. Abdominal:      General: Abdomen is flat. Bowel sounds are normal.   Musculoskeletal: Normal range of motion. Skin:     General: Skin is warm and dry. Neurological:      General: No focal deficit present. Mental Status: He is alert. Mental status is at baseline. Sensory: Sensation is intact. Motor: Motor function is intact. Coordination: Coordination is intact. Gait: Gait is intact. Comments: Expressive aphasia/apraxia.    Psychiatric:         Mood and Affect: Mood normal.       DATA:       Labs:  CBC:   Recent Labs     03/26/20  0014   WBC 9.3   HGB 17.2   HCT 50.6          BMP:   Recent Labs     03/26/20  0014 03/26/20  1029    138   K 3.8 4.2    101   CO2 19* 16*   BUN 10 10   CREATININE 0.9 0.9   GLUCOSE 95 94   PHOS  --  4.2     LFT's:   Recent Labs     03/26/20  0013   AST 69*   ALT 78*   BILITOT 0.8   ALKPHOS 92

## 2020-03-26 NOTE — PROGRESS NOTES
Physical Therapy/Occupational Therapy   Hold   Orders received. Chart reviewed. Per chart pt received tPA at 1:11am this morning 3/26. Pt with bedrest until 1:11am 3/27. PT/OT will attempt to evaluate pt on 3/27 am as appropriate and as schedule permits after 59VW bedrest time completed.         Rakan Toribio, PT, DPT [id number]   [place] Cons, OTR/L, [id number]

## 2020-03-26 NOTE — CONSULTS
Clinical Pharmacy Progress Note  Medication History     Admit Date: 3/26/2020    Subjective/Objective:  28 yo male admitted with stroke and expressive aphasia. Asked by Dr. Benítez to clarify home medications. List of current medications patient is taking is complete. Home medication list in EPIC updated to reflect changes noted below. Source of information: outpatient fill records - patient is unable to answer questions regarding his home medications at this time    Per [facility] fill records, recent medication fills include:  · Dextroamphetamine-amphetamine 15 mg twice daily (last filled 11/21/19 for a 30 day supply)  · Escitalopram 20 mg daily (last filled 3/9/20 for a 30 day supply)    Please note: I was unable to evaluate the patient for any over-the-counter medications and/or herbal supplements that he may be taking. Please call with any questions.   Leeroy Perez PharmD, [facility]  Wireless: [phone]  3/26/2020 9:07 AM

## 2020-03-27 ENCOUNTER — APPOINTMENT (OUTPATIENT)
Dept: CT IMAGING | Age: 35
DRG: 062 | End: 2020-03-27
Payer: COMMERCIAL

## 2020-03-27 ENCOUNTER — NURSE ONLY (OUTPATIENT)
Dept: CARDIOLOGY CLINIC | Age: 35
End: 2020-03-27

## 2020-03-27 VITALS
OXYGEN SATURATION: 94 % | HEIGHT: 72 IN | BODY MASS INDEX: 25.62 KG/M2 | SYSTOLIC BLOOD PRESSURE: 119 MMHG | WEIGHT: 189.15 LBS | DIASTOLIC BLOOD PRESSURE: 102 MMHG | TEMPERATURE: 98.4 F | HEART RATE: 83 BPM | RESPIRATION RATE: 19 BRPM

## 2020-03-27 LAB
ANION GAP SERPL CALCULATED.3IONS-SCNC: 14 MMOL/L (ref 3–16)
BUN BLDV-MCNC: 10 MG/DL (ref 7–20)
CALCIUM SERPL-MCNC: 9.1 MG/DL (ref 8.3–10.6)
CHLORIDE BLD-SCNC: 105 MMOL/L (ref 99–110)
CO2: 21 MMOL/L (ref 21–32)
CREAT SERPL-MCNC: 0.8 MG/DL (ref 0.9–1.3)
GFR AFRICAN AMERICAN: >60
GFR NON-AFRICAN AMERICAN: >60
GLUCOSE BLD-MCNC: 112 MG/DL (ref 70–99)
HCT VFR BLD CALC: 45.7 % (ref 40.5–52.5)
HEMOGLOBIN: 15.8 G/DL (ref 13.5–17.5)
MCH RBC QN AUTO: 35.1 PG (ref 26–34)
MCHC RBC AUTO-ENTMCNC: 34.6 G/DL (ref 31–36)
MCV RBC AUTO: 101.5 FL (ref 80–100)
PDW BLD-RTO: 12.7 % (ref 12.4–15.4)
PLATELET # BLD: 221 K/UL (ref 135–450)
PMV BLD AUTO: 9.1 FL (ref 5–10.5)
POTASSIUM REFLEX MAGNESIUM: 3.6 MMOL/L (ref 3.5–5.1)
RBC # BLD: 4.5 M/UL (ref 4.2–5.9)
SODIUM BLD-SCNC: 140 MMOL/L (ref 136–145)
WBC # BLD: 9.3 K/UL (ref 4–11)

## 2020-03-27 PROCEDURE — 6370000000 HC RX 637 (ALT 250 FOR IP): Performed by: STUDENT IN AN ORGANIZED HEALTH CARE EDUCATION/TRAINING PROGRAM

## 2020-03-27 PROCEDURE — 92526 ORAL FUNCTION THERAPY: CPT

## 2020-03-27 PROCEDURE — 80048 BASIC METABOLIC PNL TOTAL CA: CPT

## 2020-03-27 PROCEDURE — 0296T PR EXT ECG > 48HR TO 21 DAY RCRD W/CONECT INTL RCRD: CPT | Performed by: INTERNAL MEDICINE

## 2020-03-27 PROCEDURE — 97165 OT EVAL LOW COMPLEX 30 MIN: CPT

## 2020-03-27 PROCEDURE — 99232 SBSQ HOSP IP/OBS MODERATE 35: CPT | Performed by: INTERNAL MEDICINE

## 2020-03-27 PROCEDURE — 2580000003 HC RX 258: Performed by: STUDENT IN AN ORGANIZED HEALTH CARE EDUCATION/TRAINING PROGRAM

## 2020-03-27 PROCEDURE — 92507 TX SP LANG VOICE COMM INDIV: CPT

## 2020-03-27 PROCEDURE — 97530 THERAPEUTIC ACTIVITIES: CPT

## 2020-03-27 PROCEDURE — 6360000002 HC RX W HCPCS: Performed by: STUDENT IN AN ORGANIZED HEALTH CARE EDUCATION/TRAINING PROGRAM

## 2020-03-27 PROCEDURE — 97535 SELF CARE MNGMENT TRAINING: CPT

## 2020-03-27 PROCEDURE — 36415 COLL VENOUS BLD VENIPUNCTURE: CPT

## 2020-03-27 PROCEDURE — 85027 COMPLETE CBC AUTOMATED: CPT

## 2020-03-27 PROCEDURE — 70450 CT HEAD/BRAIN W/O DYE: CPT

## 2020-03-27 RX ORDER — DILTIAZEM HYDROCHLORIDE 180 MG/1
360 CAPSULE, COATED, EXTENDED RELEASE ORAL DAILY
Status: DISCONTINUED | OUTPATIENT
Start: 2020-03-27 | End: 2020-03-27 | Stop reason: HOSPADM

## 2020-03-27 RX ORDER — SODIUM CHLORIDE 0.9 % (FLUSH) 0.9 %
10 SYRINGE (ML) INJECTION EVERY 12 HOURS SCHEDULED
Status: DISCONTINUED | OUTPATIENT
Start: 2020-03-27 | End: 2020-03-27 | Stop reason: HOSPADM

## 2020-03-27 RX ORDER — LANOLIN ALCOHOL/MO/W.PET/CERES
100 CREAM (GRAM) TOPICAL DAILY
Qty: 30 TABLET | Refills: 3 | Status: SHIPPED | OUTPATIENT
Start: 2020-03-28 | End: 2022-04-28

## 2020-03-27 RX ORDER — ACETAMINOPHEN 325 MG/1
650 TABLET ORAL EVERY 6 HOURS PRN
Status: DISCONTINUED | OUTPATIENT
Start: 2020-03-27 | End: 2020-03-27 | Stop reason: HOSPADM

## 2020-03-27 RX ORDER — MULTIVITAMIN WITH FOLIC ACID 400 MCG
1 TABLET ORAL DAILY
Status: DISCONTINUED | OUTPATIENT
Start: 2020-03-27 | End: 2020-03-27 | Stop reason: HOSPADM

## 2020-03-27 RX ORDER — DILTIAZEM HYDROCHLORIDE 360 MG/1
360 CAPSULE, EXTENDED RELEASE ORAL DAILY
Qty: 30 CAPSULE | Refills: 1 | Status: SHIPPED | OUTPATIENT
Start: 2020-03-28 | End: 2020-06-30

## 2020-03-27 RX ORDER — MULTIVITAMIN WITH FOLIC ACID 400 MCG
1 TABLET ORAL DAILY
Qty: 30 TABLET | Refills: 1 | Status: SHIPPED | OUTPATIENT
Start: 2020-03-28 | End: 2022-04-28

## 2020-03-27 RX ORDER — UBIDECARENONE 75 MG
50 CAPSULE ORAL DAILY
Status: DISCONTINUED | OUTPATIENT
Start: 2020-03-27 | End: 2020-03-27 | Stop reason: HOSPADM

## 2020-03-27 RX ORDER — SODIUM CHLORIDE 0.9 % (FLUSH) 0.9 %
10 SYRINGE (ML) INJECTION PRN
Status: DISCONTINUED | OUTPATIENT
Start: 2020-03-27 | End: 2020-03-27 | Stop reason: HOSPADM

## 2020-03-27 RX ORDER — ASPIRIN 81 MG/1
81 TABLET ORAL DAILY
Qty: 30 TABLET | Refills: 1 | Status: SHIPPED | OUTPATIENT
Start: 2020-03-28 | End: 2020-03-27 | Stop reason: HOSPADM

## 2020-03-27 RX ORDER — THIAMINE MONONITRATE (VIT B1) 100 MG
100 TABLET ORAL DAILY
Status: DISCONTINUED | OUTPATIENT
Start: 2020-03-27 | End: 2020-03-27 | Stop reason: HOSPADM

## 2020-03-27 RX ORDER — FOLIC ACID 1 MG/1
1 TABLET ORAL DAILY
Qty: 30 TABLET | Refills: 3 | Status: SHIPPED | OUTPATIENT
Start: 2020-03-28 | End: 2022-04-28

## 2020-03-27 RX ORDER — FOLIC ACID 1 MG/1
1 TABLET ORAL DAILY
Status: DISCONTINUED | OUTPATIENT
Start: 2020-03-27 | End: 2020-03-27 | Stop reason: HOSPADM

## 2020-03-27 RX ORDER — ATORVASTATIN CALCIUM 80 MG/1
80 TABLET, FILM COATED ORAL NIGHTLY
Qty: 30 TABLET | Refills: 1 | Status: SHIPPED | OUTPATIENT
Start: 2020-03-27 | End: 2022-04-28

## 2020-03-27 RX ADMIN — THERA TABS 1 TABLET: TAB at 08:48

## 2020-03-27 RX ADMIN — ASPIRIN 81 MG: 81 TABLET, COATED ORAL at 03:29

## 2020-03-27 RX ADMIN — FOLIC ACID 1 MG: 1 TABLET ORAL at 08:48

## 2020-03-27 RX ADMIN — ESCITALOPRAM OXALATE 20 MG: 20 TABLET ORAL at 08:47

## 2020-03-27 RX ADMIN — ENOXAPARIN SODIUM 40 MG: 40 INJECTION SUBCUTANEOUS at 03:29

## 2020-03-27 RX ADMIN — Medication 10 ML: at 10:35

## 2020-03-27 RX ADMIN — Medication 100 MG: at 08:48

## 2020-03-27 RX ADMIN — VITAM B12 50 MCG: 100 TAB at 08:50

## 2020-03-27 RX ADMIN — ACETAMINOPHEN 650 MG: 325 TABLET ORAL at 01:40

## 2020-03-27 RX ADMIN — DILTIAZEM HYDROCHLORIDE 360 MG: 180 CAPSULE, COATED, EXTENDED RELEASE ORAL at 08:48

## 2020-03-27 ASSESSMENT — PAIN SCALES - GENERAL
PAINLEVEL_OUTOF10: 0
PAINLEVEL_OUTOF10: 3
PAINLEVEL_OUTOF10: 0

## 2020-03-27 ASSESSMENT — ENCOUNTER SYMPTOMS
ALLERGIC/IMMUNOLOGIC NEGATIVE: 1
RESPIRATORY NEGATIVE: 1
EYES NEGATIVE: 1
GASTROINTESTINAL NEGATIVE: 1

## 2020-03-27 ASSESSMENT — PAIN DESCRIPTION - PAIN TYPE: TYPE: CHRONIC PAIN

## 2020-03-27 ASSESSMENT — PAIN DESCRIPTION - LOCATION: LOCATION: BACK

## 2020-03-27 ASSESSMENT — PAIN - FUNCTIONAL ASSESSMENT: PAIN_FUNCTIONAL_ASSESSMENT: ACTIVITIES ARE NOT PREVENTED

## 2020-03-27 ASSESSMENT — PAIN DESCRIPTION - DESCRIPTORS: DESCRIPTORS: ACHING

## 2020-03-27 NOTE — DISCHARGE SUMMARY
Will follow-up on OP basis. Recommends Eliquis 5mg BID which will be started tomorrow 3/28 per neuro recs. ECHO with 50% EF, intermediate diastolic function. No evidence of R-L shunt per bubble study. Alcohol abuse- CIWA protocol initiated. No obvious signs of alcohol withdrawal during this admission. We discussed the risks of drinking in setting of Eliquis use (bleeding risk, death). Patient understands risks and is committed to cut back on drinking. Various resources were provided on discharge. Patient will be discharged this afternoon, neurology on board with discharge planning. Physical Exam:  BP (!) 119/102   Pulse 83   Temp 98.4 °F (36.9 °C) (Oral)   Resp 19   Ht 6' (1.829 m)   Wt 189 lb 2.5 oz (85.8 kg)   SpO2 94%   BMI 25.65 kg/m²   General appearance: alert, appears stated age and cooperative  Head: Normocephalic, without obvious abnormality, atraumatic  Eyes: conjunctivae/corneas clear. PERRL, EOM's intact. Fundi benign. Ears: normal TM's and external ear canals both ears  Nose: Nares normal. Septum midline. Mucosa normal. No drainage or sinus tenderness.   Throat: lips, mucosa, and tongue normal; teeth and gums normal  Neck: no adenopathy, no carotid bruit, no JVD, supple, symmetrical, trachea midline and thyroid not enlarged, symmetric, no tenderness/mass/nodules  Lungs: clear to auscultation bilaterally  Heart: regular rate and rhythm, S1, S2 normal, no murmur, click, rub or gallop  Abdomen: soft, non-tender; bowel sounds normal; no masses,  no organomegaly  Extremities: extremities normal, atraumatic, no cyanosis or edema  Neurologic: Grossly normal    Consults: Neurology, EP cardiology   Significant Diagnostic Studies:  CT head, MRI   Treatments: TPA, eliquis, statin, diltiazem  Disposition: home  Discharged Condition: Stable  Follow Up: Primary Care Physician in two weeks    DISCHARGE MEDICATION:     Medication List      START taking these medications    apixaban 5 MG Tabs

## 2020-03-27 NOTE — CARE COORDINATION
Speech Therapy Script. LOC at discharge: Not Applicable  DONNIE Completed: Not Indicated    Notification completed in HENS/PAS?:  Not Applicable    IMM Completed:   Not Indicated    Transportation:  Transportation Plan for discharge: family   Mode of Transport: [address] ordered at discharge: Not [address]: Not Applicable  Orders faxed: No    Durable Medical Equipment:  DME Provider: None   Equipment obtained during hospitalization: No DME Need     Home Oxygen and Respiratory Equipment:  Oxygen needed at discharge?: Not Indicated    Dialysis:  Dialysis patient: No    Referrals made at [facility] for outpatient continued care:  [facility]    Additional CM Notes:   SW rounded on this date. Patient cleared by PT/OT. Could benefit from speech therapy post discharge. Outpatient Speech script requested. Resident to obtain from Dr. Christal Fuentes and provide to patient. Patient to contact insurance for [employer]. May be a challenge with current pandemic and delay start date. No other needs noted. EtOH resources provided. The Plan for Transition of Care is related to the following treatment goals troke aborted by administration of thrombolytic agent [facility]) [I63.9]     The Patient and/or patient representative Patient was provided with a choice of provider and agrees with the discharge plan Yes    Freedom of choice list was provided with basic dialogue that supports the patient's individualized plan of care/goals and shares the quality data associated with the providers.  Yes    Care Transitions patient: No    CHEYANNE Espinal  The [address] [place] [address] ICU  Case Management Department  Ph: [phone]

## 2020-03-27 NOTE — PROGRESS NOTES
mass effect       CTA head and neck  CTA without LVO. Questionable distal stenosis in MCAs bilaterally right more than left.     Impression:  1. Acute ischemic stroke  2. Atrial fibrillation  3. ETOH abuse   4. Tobacco use     26 yo with acute language disorder found to have left frontal lobe acute ischemic stroke status post tPA in the setting of atrial fibrillation. Writing is better than expressive function but excellent receptive function. Apraxia of speech, aphemia, or partial motor aphasia. Recommendations:  -Await ECHO  - Appreciate EP involvement . OK to initiate NOAC tomorrow morning(this was discussed with the attending neurologist)  - Continue aspirin and statin  - OK for DVT chemophylaxis   - CIWA  -PT/OT/ST, rehab as able  - Maintain good secondary stroke prevention; LDL goal 70 and below, A1C goal 7.0 and below, BP goal 140/90 and below  - Continue on telemetry while admitted  - Smoking cessation.   -Further workup of calvarium lesions as per Medical attending.      A copy of this note was provided for Dr Branden Cleaning MD     April Ammon [address] Neurology  [phone]  Evenings, weekends, and off weeks please discuss with the covering neurologist.

## 2020-03-27 NOTE — PROGRESS NOTES
polyethylene glycol, promethazine **OR** ondansetron, perflutren lipid microspheres     Patient Active Problem List    Diagnosis Date Noted    Stroke aborted by administration of thrombolytic agent ([facility] 03/26/2020      Active Hospital Problems    Diagnosis Date Noted    Stroke aborted by administration of thrombolytic agent ([facility] [I63.9] 03/26/2020       Assessment and Plan:     1. Atrial fibrillation with rapid ventricular response  2. EtOH abuse  3. Stroke    Started on Eliquis 5 mg bid  Off Cardizem gtt and on PO cardizem 360 mg daily  Placed Zio patch for 2 weeks  OK to Dc from EP standpoint  F/u wih me in 4 weeks    Thank you for allowing me to participate in the care of this patient. If you have any questions, please do not hesitate to contact me.     Inocente Romo MD   Cardiac Electrophysiology  [facility]

## 2020-03-27 NOTE — PROGRESS NOTES
ICU Progress Note       Code:Full Code  Admit Date: 3/26/2020  Diet: DIET GENERAL;    CC:   Chief Complaint   Patient presents with    Cerebrovascular Accident       ON:  - CTH stable  -Started on PO dilt    Interval Hx:  - Seen at bedside this AM , without acute complaints  - Denied nausea, vomiting, fever, chills, headache, vision changes, SOB, chest pain, abdominal pain. - Discharge home today  MEDICATIONS:   Scheduled Meds:   dilTIAZem  360 mg Oral Daily    sodium chloride flush  10 mL Intravenous 2 times per day    folic acid  1 mg Oral Daily    thiamine  100 mg Oral Daily    multivitamin  1 tablet Oral Daily    enoxaparin  40 mg Subcutaneous Daily    aspirin  81 mg Oral Daily    atorvastatin  80 mg Oral Nightly    escitalopram  20 mg Oral Daily      Continuous Infusions:   dilTIAZem 5 mg/hr (03/26/20 1843)     PRN Meds:acetaminophen, sodium chloride flush, polyethylene glycol, promethazine **OR** ondansetron, perflutren lipid microspheres    Allergies: No Known Allergies  PHYSICAL EXAM:       Vitals:   T-max:  Patient Vitals for the past 8 hrs:   BP Temp Temp src Pulse Resp SpO2   03/27/20 0600 121/86 -- -- 90 18 92 %   03/27/20 0500 118/78 -- -- 80 20 95 %   03/27/20 0400 (!) 129/96 98.2 °F (36.8 °C) Oral 66 23 91 %   03/27/20 0300 (!) 130/94 -- -- 70 18 90 %   03/27/20 0200 113/79 -- -- 93 21 92 %   03/27/20 0100 101/74 -- -- 102 22 94 %       Intake/Output Summary (Last 24 hours) at 3/27/2020 0824  Last data filed at 3/27/2020 0000  Gross per 24 hour   Intake 2115.44 ml   Output 650 ml   Net 1465.44 ml     No intake/output data recorded. I/O last 3 completed shifts: In: 2115.4 [P.O.:1400; I.V.:715.4]  Out: 1750 [Urine:1750]  Review of Systems   Constitutional: Negative. HENT: Negative. Eyes: Negative. Respiratory: Negative. Cardiovascular: Negative. Gastrointestinal: Negative. Endocrine: Negative. Genitourinary: Negative. Musculoskeletal: Negative. Skin: Negative. Allergic/Immunologic: Negative. Neurological: Positive for speech difficulty. Hematological: Negative. Psychiatric/Behavioral: Negative. Physical Exam  Constitutional:       Appearance: Normal appearance. HENT:      Head: Normocephalic. Nose: Nose normal.      Mouth/Throat:      Mouth: Mucous membranes are moist.      Pharynx: Oropharynx is clear. Eyes:      Extraocular Movements: Extraocular movements intact. Conjunctiva/sclera: Conjunctivae normal.      Pupils: Pupils are equal, round, and reactive to light. Neck:      Musculoskeletal: Normal range of motion and neck supple. Cardiovascular:      Rate and Rhythm: Normal rate and regular rhythm. Pulses: Normal pulses. Pulmonary:      Effort: Pulmonary effort is normal.   Abdominal:      General: Abdomen is flat. Bowel sounds are normal.   Musculoskeletal: Normal range of motion. Skin:     General: Skin is warm and dry. Neurological:      General: No focal deficit present. Mental Status: He is alert and oriented to person, place, and time. Psychiatric:         Mood and Affect: Mood normal.       DATA:       Labs:  CBC:   Recent Labs     03/26/20  0014 03/27/20  0646   WBC 9.3 9.3   HGB 17.2 15.8   HCT 50.6 45.7    221       BMP:   Recent Labs     03/26/20  0014 03/26/20  1029 03/27/20  0646    138 140   K 3.8 4.2 3.6    101 105   CO2 19* 16* 21   BUN 10 10 10   CREATININE 0.9 0.9 0.8*   GLUCOSE 95 94 112*   PHOS  --  4.2  --      LFT's:   Recent Labs     03/26/20  0013   AST 69*   ALT 78*   BILITOT 0.8   ALKPHOS 92     Troponin:   Recent Labs     03/26/20  0014   TROPONINI <0.01     BNP:No results for input(s): BNP in the last 72 hours. ABGs:  No results for input(s): PHART, MXQ6ZGF, PO2ART, FQU6NWE, BEART, THGBART, T8IBUXEW, DKU1VJB in the last 72 hours.     INR:   Recent Labs     03/26/20  1029   INR 1.20*       U/A:  Recent Labs     03/26/20  1102   PHUR 5.0       RADIOLOGY  CT HEAD WO CONTRAST   Final Result     No acute intracranial abnormality. MRI BRAIN WO CONTRAST   Final Result      Small acute ischemic insult in the lateral left frontal lobe without hemorrhage, edema, or mass effect. CTA HEAD NECK W CONTRAST   Final Result     No acute CTA findings in the brain.       _______________________________________________       IMPRESSION:          No acute CTA findings in the neck. CT HEAD WO CONTRAST   Final Result     No acute intracranial findings. There are areas of scattered ovoid lobulated sclerotic lesions in the    calvarium most prominently right occipital bone measuring 1.7 cm which    are indeterminate. 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 Osteoblastic metastatic disease is difficult to exclude.               ASSESSMENT AND PLAN:   Neurology  CVA  - Onset of symptoms were 3 to 4 hours before presentation within TPA window  MRI Brain, cervical and intracranial vascular imaging, TTE with Bubble Study, Telemetry, HbA1c, Fasting Lipids.  - Patient received TPA after discussion with stroke team  - Slight improvement of symptoms on initial examination by ICU team  - Symptoms most likely secondary to embolic stroke setting of atrial fibrillation  - Post TPA care, no antiplatelets or anticoagulation for 24 hours  - Post TPA CT scan in 24 hours  - We will start aspirin and DVT prophylaxis after repeat CT head is normal  - Normal bedside swallow  - High-dose atorvastatin 80 mg nightly  - MRI/Echo/CT Q24 after tPA admin  - PT/OT/Speech     Atrial fibrillation, paroxysmal  - History of palpitations, shortness of breath and lightheadedness over the past few weeks  - Per domestic partner was told by sleep study facility that potentially has cardiac arrhythmia with signs of strokes  - Sleep study records requested and will be brought in by domestic partner  - Rate control with diltiazem drip  - Consultation of cardiology and electrophysiology  - May benefit

## 2020-03-27 NOTE — PROGRESS NOTES
Speech Language Pathology  Facility/Department: [facility]  Speech/language Daily Treatment Note    NAME: Alissa Jim  : 1985  MRN: [id number]    Patient Diagnosis(es):   Patient Active Problem List    Diagnosis Date Noted    Stroke aborted by administration of thrombolytic agent ([facility] 2020     Allergies: No Known Allergies    MRI brain 3/26/2020  Small acute ischemic insult in the lateral left frontal lobe without hemorrhage, edema, or mass effect.         Chart reviewed. Pain: denies    Medical diagnosis: stroke  Treatment diagnosis: apraxia of speech    Treatment:  Pt seen to address the following goals:  1-  Pt will utilize 10 high-frequency words via verbal or nonverbal modalities with 60% consistency to improve ability to communicate basic needs with familiar and unfamiliar speakers  3/27: pt with difficulty in connected speech. Pt using writing  when not able to communicate verbally. Pt demonstrates good awareness. Cont goal    2- pt will repeat single words with 80% accuracy and mod cues  3/27: pt exhibited significant difficulty with /t/ and /o/ sounds. Practiced /o/ in isolation only and pt able to produce 10/10 accurately after extensive repetition with use of verbal and visual cues from SLP. After extensive repetition with /f/, pt able to produce in initial position of single syllable words. Pt provided with handout of words with initial /f/ sound and /o/ in isolation to practice independently. Pt pleased with ability to complete these tasks and eager to practice on his own. Cont goal    3- pt will participate in further speech/lang/cog assessment as appropriate  3/27: not targeted this date  Cont as appropriate    Education:  Pt educated to importance of follow up therapy upon dc. D/w case management Lindsey Rojo as well who was obtaining order for ut pt therapy.   Pt stated good understanding    Plan:  Continue speech/language therapy to address above goals, 3-5 x/week x LOS  DC

## 2020-03-27 NOTE — PROGRESS NOTES
Pt's discharge paper work was discussed with Pt. Pt with no questions at this time. Pt instructed RN to call significant other Gm Pruett for ride home. When asked if Pt wanted RN to notify his mother, he said no. Pt instructed on importance of making follow up appointments, stroked education, importance of following medications as prescribed, importance of not drinking while on these medications. RN will go over this education again with Pt's significant other when she arrives to pick Pt up.      Theresa Davis

## 2020-03-27 NOTE — PROGRESS NOTES
Speech Language Pathology  Facility/Department: [facility] ICU  Dysphagia Daily Treatment Note/DC    ME: Alicia Longoria  : 1985  MRN: [id number]     ADMISSION DATE: 3/26/2020  ADMITTING DIAGNOSIS: has Stroke aborted by administration of thrombolytic agent ([address] on their problem list.  ONSET DATE: 3/26/2020     Recent Chest Xray not completed     CT of head 3/26/2020  No acute intracranial findings.     There are areas of scattered ovoid lobulated sclerotic lesions in the    calvarium most prominently right occipital bone measuring 1.7 cm which    are indeterminate.  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     Osteoblastic metastatic disease is difficult to exclude.          MRI brain 3/26/2020  Small acute ischemic insult in the lateral left frontal lobe without hemorrhage, edema, or mass effect.         Current Diet level:  Current Diet : NPO  Current Liquid Diet : NPO     Primary Complaint  Patient Complaint: frustrated by speech deficits     Pain:  Pain Assessment  Pain Assessment: none reported    MBS results:  N/a    Bedside Impression 3/26/2020  Pt alert, oriented, follows commands, speech is apraxic. Oral structures grossly intact, no asymmetry noted. Pt able to cough and swallow on command. Presented pt with puree, thin liquids via cup / straw, including 3 ounces of uninterrupted swallows, as well as a jen cracker. Pt demonstrated no overt signs of aspiration: no coughing/throat clearing or change in vocal quality. Good labial seal with no anterior loss of liquids. Good swallow movement noted upon palpation of anterior neck. Pt exhibited no difficulty with mastication of cracker, no oral residue. No c/o globus sensation     Recommended Diet and Intervention  Diet Solids Recommendation: Regular  Liquid Consistency Recommendation:  Thin  Recommended Form of Meds: Whole with water  Therapeutic Interventions: Diet tolerance monitoring;Patient/Family education;Oral care     Compensatory Swallowing Strategies  Compensatory Swallowing Strategies: Upright as possible for all oral intake     Treatment/Goals  Pt to be seen to address the following goals:  1-The patient will tolerate recommended diet without observed clinical signs of aspiration  3/27: RN reports no concerns with swallowing. Pt analyzed with thin liquids via cup (pt preference) including 3 ounces of uninterrupted swallows with no throat clearing, coughing or change in voice. Pt demonstrated adequate mastication of solid texture. Adequate labial seal with no anterior loss  Goal met    2- The patient Casey Michaels will verbalize/demonstrate understanding of dysphagia recommendations  3/26: Educated pt to purpose of visit, s/s of aspiration, concern if aspiration occurs, rationale for diet recommendation/strategies to reduce risk for aspiration and instruction to notify staff if any signs emerge. Pt demonstrated understanding  Cont goal  3/27: pt educated to purpose of visit, reviewed s/s of aspiration and concern if aspiration occurs. Instructed to notify staff/MD if any signs emerge. Pt stated understanding  Goal met    Education  As above    Plan:  Recommended diet: regular diet/thin liquids -if any s/s of aspiration emerge, or there is respiratory decline, please re-refer to SLP  Dc recommendation: Pt will benefit from ongoing speech/language therapy     Leesa Ritchie M.S./CCC-SLP #[id number]  Pg.  # [phone]  Needs met prior to leaving room, call light within reach, d/w TK Hicks    3/26/2020 9:44 AM

## 2020-03-27 NOTE — PROGRESS NOTES
Occupational Therapy   Occupational Therapy Initial Assessment/Tx/Discharge Note  Date: 3/27/2020   Patient Name: Vista Lanes  MRN: [id number]     : 1985    Date of Service: 3/27/2020    Discharge Recommendations: Vista Lanes scored a 24/24 on the -PAC ADL Inpatient form. At this time, no further OT is recommended upon discharge. Recommend patient returns to prior setting with prior services. OT Equipment Recommendations  Equipment Needed: No    Assessment   Assessment: Pt exhibits speech impairments, but no other neuro deficits. Pt completed functional mobility and ADLs without difficulty and tolerated activity well. No acute OT needs identified. Pt will continue with speech therapy. Recommend home with prn assist.   Decision Making: Low Complexity  No Skilled OT: Safe to return home; No OT goals identified  REQUIRES OT FOLLOW UP: No  Activity Tolerance  Activity Tolerance: Patient Tolerated treatment well  Activity Tolerance: Pt on O2 on arrival. Removed by pt in bed. spO2 92% at rest, then 96% after ambulation. Left on RA. RN aware. Safety Devices  Safety Devices in place: Yes  Type of devices: Call light within reach; Left in chair;Chair alarm in place;Nurse notified               Restrictions  Position Activity Restriction  Other position/activity restrictions: no restrictions noted     Subjective   General  Chart Reviewed: Yes  Additional Pertinent Hx: 27 y.o. M admitted 3/26 with CVA s/p tPA 0100. PMHx includes Afib, alcoholism  Family / Caregiver Present: No  Referring Practitioner: James  Diagnosis: stroke aborted by tPA  Subjective  Subjective: Pt in bed on entry. Flat, but pleasant and cooperative. Feels fine other than his speech.    Pain Assessment  Pain Assessment: 0-10(no c/o pain)    Social/Functional History  Social/Functional History  Lives With: Spouse  Type of Home: House  Home Layout: Two level, Bed/Bath upstairs, 1/2 bath on main level, Able to Live on Main level with

## 2020-04-16 PROCEDURE — 0298T PR EXT ECG > 48HR TO 21 DAY REVIEW AND INTERPRETATN: CPT | Performed by: INTERNAL MEDICINE

## 2020-04-17 ENCOUNTER — HOSPITAL ENCOUNTER (OUTPATIENT)
Dept: SPEECH THERAPY | Age: 35
Setting detail: THERAPIES SERIES
Discharge: HOME | End: 2020-04-17
Payer: COMMERCIAL

## 2020-04-17 PROCEDURE — 92523 SPEECH SOUND LANG COMPREHEN: CPT

## 2020-04-21 NOTE — PROGRESS NOTES
Cardiac Electrophysiology Consultation  Via  Telehealth Evaluation - Audio/Visual (during VPIPW-04 public health emergency)        Date: 2020  Reason for Consultation:  CVA/AF   Consult Requesting Physician: No att. providers found     Chief Complaint:   Chief Complaint   Patient presents with    Follow-up     2 week follow up ZIO Patch       HPI:    Sabino Larose (:  1985) is a 27 y.o. male who has requested an audio/video evaluation and has a history of CVA and AF. PMH apart from alcoholism. On 3/26/20, he was admitted to the hospital secondary to expressive aphasia. Due to concerns of stroke, patient received TPA. He was also noted to have AF RVR, placed on ditlitazem for rate control and eventually Eliquis for stroke prevention. Patient did endorse having palpitations, SOB, and occasional lightheadedness for 3 weeks prior to admission. He denies any known history of PAF. Denies any recreational drug use. 14 day monitor (3/2020) showed SR with PAF burden of 38%. Longest duration of AF lasted 5 days with rates as high as 216 bpm.    Interval History: Today, he is being seen virtually after hospitalization. He continues to struggle with expressive aphasia, but states that this has been improving. He does complain of rapid palpitations at times associated with SOB and lightheadedness. Denies complaints of CP. Atrial Fibrillation:    BMI    :   Body mass index is 25.36 kg/m². Duration   :   3/2020    Symptoms   :   Shortness of breath, palpitations, occasional lightheadedness    Previous DCCV :   none    Previous AAD           :    none    Beta blocker  :   none    ACE / ARB  :   none    OAC   :   Eliquis    LVEF    :   50%    Left atrial size :   3.6 cm    ZINA   :   Not tested      Past Medical History:   Diagnosis Date    Alcoholism ([facility]     Atrial fibrillation ([facility]         No past surgical history on file.     Allergies:  No Known Allergies    Medication:   Prior to Admission

## 2020-04-23 ENCOUNTER — VIRTUAL VISIT (OUTPATIENT)
Dept: CARDIOLOGY CLINIC | Age: 35
End: 2020-04-23
Payer: COMMERCIAL

## 2020-04-23 VITALS
WEIGHT: 187 LBS | HEART RATE: 75 BPM | TEMPERATURE: 97.8 F | SYSTOLIC BLOOD PRESSURE: 129 MMHG | BODY MASS INDEX: 25.36 KG/M2 | DIASTOLIC BLOOD PRESSURE: 76 MMHG

## 2020-04-23 PROCEDURE — 99215 OFFICE O/P EST HI 40 MIN: CPT | Performed by: INTERNAL MEDICINE

## 2020-04-23 NOTE — LETTER
[employer]  EP Procedure Sheet  4/23/20    Venturaramin Cardoso  1985    Physician: Dr. Francisca Nguyen    EP Procedures   Pacemaker implant x EP Study    ICD implant  Atrial flutter ablation     Biv implant x Atrial fibrillation ablation    Generator Change  SVT ablation    Lead revision  VT ablation    Lead extraction +/- upgrade  AVN ablation    Loop implant  Cardioversion     Other:   MALENA     Equipment   [employer]tr[employer]   ALEXANDER Mapping System    [place] x [address]  CryoAblation    Biotronik  Laser Lead Extraction    Special Equipment       EP Procedures Scheduling Request  Time Requested     Specific Day    Anesthesia xxx   CT surgery backup    Location [facility]     Pre-Procedure Labs / Imaging  x PT/INR  Type & cross   x CBC  Units PRBC   x BMP/Mg  Units FFP    Venogram  CXR    Echo x Pulmonary CTA for Pulmonary vein mapping     Patient Instructions      Date of last admission: [facility] 3/27/20

## 2020-04-24 ENCOUNTER — TELEPHONE (OUTPATIENT)
Dept: CARDIOLOGY CLINIC | Age: 35
End: 2020-04-24

## 2020-04-28 ENCOUNTER — PREP FOR PROCEDURE (OUTPATIENT)
Dept: CARDIOLOGY CLINIC | Age: 35
End: 2020-04-28

## 2020-05-05 ENCOUNTER — HOSPITAL ENCOUNTER (OUTPATIENT)
Dept: CT IMAGING | Age: 35
Discharge: HOME | End: 2020-05-05
Payer: COMMERCIAL

## 2020-05-05 LAB
ANION GAP SERPL CALCULATED.3IONS-SCNC: 15 MMOL/L (ref 3–16)
BASOPHILS ABSOLUTE: 0.1 K/UL (ref 0–0.2)
BASOPHILS RELATIVE PERCENT: 0.9 %
BUN BLDV-MCNC: 7 MG/DL (ref 7–20)
CALCIUM SERPL-MCNC: 9.2 MG/DL (ref 8.3–10.6)
CHLORIDE BLD-SCNC: 99 MMOL/L (ref 99–110)
CO2: 22 MMOL/L (ref 21–32)
CREAT SERPL-MCNC: 0.6 MG/DL (ref 0.9–1.3)
EOSINOPHILS ABSOLUTE: 0.2 K/UL (ref 0–0.6)
EOSINOPHILS RELATIVE PERCENT: 4 %
GFR AFRICAN AMERICAN: >60
GFR NON-AFRICAN AMERICAN: >60
GLUCOSE BLD-MCNC: 94 MG/DL (ref 70–99)
HCT VFR BLD CALC: 45.4 % (ref 40.5–52.5)
HEMOGLOBIN: 16.1 G/DL (ref 13.5–17.5)
INR BLD: 1.1 (ref 0.86–1.14)
LYMPHOCYTES ABSOLUTE: 2.6 K/UL (ref 1–5.1)
LYMPHOCYTES RELATIVE PERCENT: 48.5 %
MCH RBC QN AUTO: 33.9 PG (ref 26–34)
MCHC RBC AUTO-ENTMCNC: 35.6 G/DL (ref 31–36)
MCV RBC AUTO: 95.3 FL (ref 80–100)
MONOCYTES ABSOLUTE: 0.4 K/UL (ref 0–1.3)
MONOCYTES RELATIVE PERCENT: 8 %
NEUTROPHILS ABSOLUTE: 2.1 K/UL (ref 1.7–7.7)
NEUTROPHILS RELATIVE PERCENT: 38.6 %
PDW BLD-RTO: 12.2 % (ref 12.4–15.4)
PLATELET # BLD: 279 K/UL (ref 135–450)
PMV BLD AUTO: 7.1 FL (ref 5–10.5)
POTASSIUM SERPL-SCNC: 4.2 MMOL/L (ref 3.5–5.1)
PROTHROMBIN TIME: 12.8 SEC (ref 10–13.2)
RBC # BLD: 4.77 M/UL (ref 4.2–5.9)
SODIUM BLD-SCNC: 136 MMOL/L (ref 136–145)
WBC # BLD: 5.4 K/UL (ref 4–11)

## 2020-05-05 PROCEDURE — 85025 COMPLETE CBC W/AUTO DIFF WBC: CPT

## 2020-05-05 PROCEDURE — 6360000004 HC RX CONTRAST MEDICATION: Performed by: INTERNAL MEDICINE

## 2020-05-05 PROCEDURE — 80048 BASIC METABOLIC PNL TOTAL CA: CPT

## 2020-05-05 PROCEDURE — 71275 CT ANGIOGRAPHY CHEST: CPT

## 2020-05-05 PROCEDURE — 85610 PROTHROMBIN TIME: CPT

## 2020-05-05 PROCEDURE — 36415 COLL VENOUS BLD VENIPUNCTURE: CPT

## 2020-05-05 RX ADMIN — IOPAMIDOL 80 ML: 755 INJECTION, SOLUTION INTRAVENOUS at 10:32

## 2020-05-07 ENCOUNTER — OFFICE VISIT (OUTPATIENT)
Dept: PRIMARY CARE CLINIC | Age: 35
End: 2020-05-07

## 2020-05-07 PROCEDURE — 99999 PR OFFICE/OUTPT VISIT,PROCEDURE ONLY: CPT | Performed by: NURSE PRACTITIONER

## 2020-05-08 LAB
SARS-COV-2: NOT DETECTED
SOURCE: NORMAL

## 2020-05-12 ENCOUNTER — ANESTHESIA (OUTPATIENT)
Dept: CARDIAC CATH/INVASIVE PROCEDURES | Age: 35
End: 2020-05-12

## 2020-05-12 ENCOUNTER — HOSPITAL ENCOUNTER (OUTPATIENT)
Dept: CARDIAC CATH/INVASIVE PROCEDURES | Age: 35
Discharge: HOME | End: 2020-05-14
Payer: COMMERCIAL

## 2020-05-12 ENCOUNTER — ANESTHESIA EVENT (OUTPATIENT)
Dept: CARDIAC CATH/INVASIVE PROCEDURES | Age: 35
End: 2020-05-12

## 2020-05-12 VITALS — TEMPERATURE: 98.9 F | HEIGHT: 72 IN | BODY MASS INDEX: 25.73 KG/M2 | WEIGHT: 190 LBS

## 2020-05-12 VITALS
TEMPERATURE: 97.9 F | SYSTOLIC BLOOD PRESSURE: 143 MMHG | OXYGEN SATURATION: 99 % | DIASTOLIC BLOOD PRESSURE: 90 MMHG | RESPIRATION RATE: 2 BRPM

## 2020-05-12 PROBLEM — I48.0 PAROXYSMAL ATRIAL FIBRILLATION (HCC): Status: ACTIVE | Noted: 2020-05-12

## 2020-05-12 LAB
EKG ATRIAL RATE: 72 BPM
EKG DIAGNOSIS: NORMAL
EKG P AXIS: 63 DEGREES
EKG P-R INTERVAL: 158 MS
EKG Q-T INTERVAL: 414 MS
EKG QRS DURATION: 82 MS
EKG QTC CALCULATION (BAZETT): 453 MS
EKG R AXIS: 75 DEGREES
EKG T AXIS: 77 DEGREES
EKG VENTRICULAR RATE: 72 BPM
POC ACT LR: 203 SEC
POC ACT LR: 301 SEC
POC ACT LR: 303 SEC
POC ACT LR: 304 SEC
POC ACT LR: 379 SEC

## 2020-05-12 PROCEDURE — 2720000010 HC SURG SUPPLY STERILE

## 2020-05-12 PROCEDURE — 93005 ELECTROCARDIOGRAM TRACING: CPT | Performed by: INTERNAL MEDICINE

## 2020-05-12 PROCEDURE — 2709999900 HC NON-CHARGEABLE SUPPLY

## 2020-05-12 PROCEDURE — 93623 PRGRMD STIMJ&PACG IV RX NFS: CPT

## 2020-05-12 PROCEDURE — 93623 PRGRMD STIMJ&PACG IV RX NFS: CPT | Performed by: INTERNAL MEDICINE

## 2020-05-12 PROCEDURE — 93613 INTRACARDIAC EPHYS 3D MAPG: CPT

## 2020-05-12 PROCEDURE — 93662 INTRACARDIAC ECG (ICE): CPT | Performed by: INTERNAL MEDICINE

## 2020-05-12 PROCEDURE — 3700000001 HC ADD 15 MINUTES (ANESTHESIA)

## 2020-05-12 PROCEDURE — 6360000002 HC RX W HCPCS: Performed by: NURSE ANESTHETIST, CERTIFIED REGISTERED

## 2020-05-12 PROCEDURE — 93613 INTRACARDIAC EPHYS 3D MAPG: CPT | Performed by: INTERNAL MEDICINE

## 2020-05-12 PROCEDURE — 2500000003 HC RX 250 WO HCPCS: Performed by: NURSE ANESTHETIST, CERTIFIED REGISTERED

## 2020-05-12 PROCEDURE — C1894 INTRO/SHEATH, NON-LASER: HCPCS

## 2020-05-12 PROCEDURE — 93622 COMP EP EVAL L VENTR PAC&REC: CPT | Performed by: INTERNAL MEDICINE

## 2020-05-12 PROCEDURE — 85347 COAGULATION TIME ACTIVATED: CPT

## 2020-05-12 PROCEDURE — 93622 COMP EP EVAL L VENTR PAC&REC: CPT

## 2020-05-12 PROCEDURE — 93010 ELECTROCARDIOGRAM REPORT: CPT | Performed by: INTERNAL MEDICINE

## 2020-05-12 PROCEDURE — C1732 CATH, EP, DIAG/ABL, 3D/VECT: HCPCS

## 2020-05-12 PROCEDURE — C1730 CATH, EP, 19 OR FEW ELECT: HCPCS

## 2020-05-12 PROCEDURE — 93662 INTRACARDIAC ECG (ICE): CPT

## 2020-05-12 PROCEDURE — 2580000003 HC RX 258: Performed by: NURSE ANESTHETIST, CERTIFIED REGISTERED

## 2020-05-12 PROCEDURE — 93656 COMPRE EP EVAL ABLTJ ATR FIB: CPT

## 2020-05-12 PROCEDURE — C1759 CATH, INTRA ECHOCARDIOGRAPHY: HCPCS

## 2020-05-12 PROCEDURE — 3700000000 HC ANESTHESIA ATTENDED CARE

## 2020-05-12 PROCEDURE — 93656 COMPRE EP EVAL ABLTJ ATR FIB: CPT | Performed by: INTERNAL MEDICINE

## 2020-05-12 RX ORDER — FLECAINIDE ACETATE 100 MG/1
100 TABLET ORAL EVERY 12 HOURS SCHEDULED
Qty: 60 TABLET | Refills: 3 | Status: SHIPPED | OUTPATIENT
Start: 2020-05-12 | End: 2020-09-08

## 2020-05-12 RX ORDER — HEPARIN SODIUM 10000 [USP'U]/100ML
INJECTION, SOLUTION INTRAVENOUS CONTINUOUS PRN
Status: DISCONTINUED | OUTPATIENT
Start: 2020-05-12 | End: 2020-05-12 | Stop reason: SDUPTHER

## 2020-05-12 RX ORDER — MIDAZOLAM HYDROCHLORIDE 1 MG/ML
INJECTION INTRAMUSCULAR; INTRAVENOUS PRN
Status: DISCONTINUED | OUTPATIENT
Start: 2020-05-12 | End: 2020-05-12 | Stop reason: SDUPTHER

## 2020-05-12 RX ORDER — HEPARIN SODIUM 1000 [USP'U]/ML
INJECTION, SOLUTION INTRAVENOUS; SUBCUTANEOUS PRN
Status: DISCONTINUED | OUTPATIENT
Start: 2020-05-12 | End: 2020-05-12 | Stop reason: SDUPTHER

## 2020-05-12 RX ORDER — FUROSEMIDE 10 MG/ML
INJECTION INTRAMUSCULAR; INTRAVENOUS PRN
Status: DISCONTINUED | OUTPATIENT
Start: 2020-05-12 | End: 2020-05-12 | Stop reason: SDUPTHER

## 2020-05-12 RX ORDER — ONDANSETRON 2 MG/ML
4 INJECTION INTRAMUSCULAR; INTRAVENOUS
Status: ACTIVE | OUTPATIENT
Start: 2020-05-12 | End: 2020-05-12

## 2020-05-12 RX ORDER — LIDOCAINE HYDROCHLORIDE 20 MG/ML
INJECTION, SOLUTION EPIDURAL; INFILTRATION; INTRACAUDAL; PERINEURAL PRN
Status: DISCONTINUED | OUTPATIENT
Start: 2020-05-12 | End: 2020-05-12 | Stop reason: SDUPTHER

## 2020-05-12 RX ORDER — SODIUM CHLORIDE, SODIUM LACTATE, POTASSIUM CHLORIDE, CALCIUM CHLORIDE 600; 310; 30; 20 MG/100ML; MG/100ML; MG/100ML; MG/100ML
INJECTION, SOLUTION INTRAVENOUS CONTINUOUS PRN
Status: DISCONTINUED | OUTPATIENT
Start: 2020-05-12 | End: 2020-05-12 | Stop reason: SDUPTHER

## 2020-05-12 RX ORDER — ONDANSETRON 2 MG/ML
INJECTION INTRAMUSCULAR; INTRAVENOUS PRN
Status: DISCONTINUED | OUTPATIENT
Start: 2020-05-12 | End: 2020-05-12 | Stop reason: SDUPTHER

## 2020-05-12 RX ORDER — FENTANYL CITRATE 50 UG/ML
50 INJECTION, SOLUTION INTRAMUSCULAR; INTRAVENOUS EVERY 5 MIN PRN
Status: DISCONTINUED | OUTPATIENT
Start: 2020-05-12 | End: 2020-05-15 | Stop reason: HOSPADM

## 2020-05-12 RX ORDER — PROTAMINE SULFATE 10 MG/ML
INJECTION, SOLUTION INTRAVENOUS PRN
Status: DISCONTINUED | OUTPATIENT
Start: 2020-05-12 | End: 2020-05-12 | Stop reason: SDUPTHER

## 2020-05-12 RX ORDER — DEXAMETHASONE SODIUM PHOSPHATE 4 MG/ML
INJECTION, SOLUTION INTRA-ARTICULAR; INTRALESIONAL; INTRAMUSCULAR; INTRAVENOUS; SOFT TISSUE PRN
Status: DISCONTINUED | OUTPATIENT
Start: 2020-05-12 | End: 2020-05-12 | Stop reason: SDUPTHER

## 2020-05-12 RX ORDER — ROCURONIUM BROMIDE 10 MG/ML
INJECTION, SOLUTION INTRAVENOUS PRN
Status: DISCONTINUED | OUTPATIENT
Start: 2020-05-12 | End: 2020-05-12 | Stop reason: SDUPTHER

## 2020-05-12 RX ORDER — OXYCODONE HYDROCHLORIDE AND ACETAMINOPHEN 5; 325 MG/1; MG/1
2 TABLET ORAL PRN
Status: ACTIVE | OUTPATIENT
Start: 2020-05-12 | End: 2020-05-12

## 2020-05-12 RX ORDER — FLECAINIDE ACETATE 100 MG/1
100 TABLET ORAL EVERY 12 HOURS SCHEDULED
Status: DISCONTINUED | OUTPATIENT
Start: 2020-05-12 | End: 2020-05-15 | Stop reason: HOSPADM

## 2020-05-12 RX ORDER — FENTANYL CITRATE 50 UG/ML
25 INJECTION, SOLUTION INTRAMUSCULAR; INTRAVENOUS EVERY 5 MIN PRN
Status: DISCONTINUED | OUTPATIENT
Start: 2020-05-12 | End: 2020-05-15 | Stop reason: HOSPADM

## 2020-05-12 RX ORDER — FENTANYL CITRATE 50 UG/ML
INJECTION, SOLUTION INTRAMUSCULAR; INTRAVENOUS PRN
Status: DISCONTINUED | OUTPATIENT
Start: 2020-05-12 | End: 2020-05-12 | Stop reason: SDUPTHER

## 2020-05-12 RX ORDER — OXYCODONE HYDROCHLORIDE AND ACETAMINOPHEN 5; 325 MG/1; MG/1
1 TABLET ORAL PRN
Status: ACTIVE | OUTPATIENT
Start: 2020-05-12 | End: 2020-05-12

## 2020-05-12 RX ORDER — LABETALOL 20 MG/4 ML (5 MG/ML) INTRAVENOUS SYRINGE
5 EVERY 10 MIN PRN
Status: DISCONTINUED | OUTPATIENT
Start: 2020-05-12 | End: 2020-05-15 | Stop reason: HOSPADM

## 2020-05-12 RX ORDER — PANTOPRAZOLE SODIUM 40 MG/1
40 TABLET, DELAYED RELEASE ORAL DAILY
Qty: 30 TABLET | Refills: 3 | Status: SHIPPED | OUTPATIENT
Start: 2020-05-12 | End: 2020-10-07

## 2020-05-12 RX ORDER — PROPOFOL 10 MG/ML
INJECTION, EMULSION INTRAVENOUS PRN
Status: DISCONTINUED | OUTPATIENT
Start: 2020-05-12 | End: 2020-05-12 | Stop reason: SDUPTHER

## 2020-05-12 RX ORDER — HYDRALAZINE HYDROCHLORIDE 20 MG/ML
5 INJECTION INTRAMUSCULAR; INTRAVENOUS EVERY 10 MIN PRN
Status: DISCONTINUED | OUTPATIENT
Start: 2020-05-12 | End: 2020-05-15 | Stop reason: HOSPADM

## 2020-05-12 RX ADMIN — SODIUM CHLORIDE, SODIUM LACTATE, POTASSIUM CHLORIDE, AND CALCIUM CHLORIDE: 600; 310; 30; 20 INJECTION, SOLUTION INTRAVENOUS at 07:42

## 2020-05-12 RX ADMIN — ROCURONIUM BROMIDE 25 MG: 10 INJECTION, SOLUTION INTRAVENOUS at 09:55

## 2020-05-12 RX ADMIN — FENTANYL CITRATE 50 MCG: 50 INJECTION INTRAMUSCULAR; INTRAVENOUS at 08:18

## 2020-05-12 RX ADMIN — FUROSEMIDE 40 MG: 10 INJECTION, SOLUTION INTRAMUSCULAR; INTRAVENOUS at 11:07

## 2020-05-12 RX ADMIN — ROCURONIUM BROMIDE 40 MG: 10 INJECTION, SOLUTION INTRAVENOUS at 07:54

## 2020-05-12 RX ADMIN — ISOPROTERENOL HYDROCHLORIDE 10 MCG/MIN: 0.2 INJECTION, SOLUTION INTRAMUSCULAR; INTRAVENOUS at 10:53

## 2020-05-12 RX ADMIN — HEPARIN SODIUM 2000 UNITS/HR: 10000 INJECTION, SOLUTION INTRAVENOUS at 09:27

## 2020-05-12 RX ADMIN — SODIUM CHLORIDE, SODIUM LACTATE, POTASSIUM CHLORIDE, AND CALCIUM CHLORIDE: 600; 310; 30; 20 INJECTION, SOLUTION INTRAVENOUS at 08:33

## 2020-05-12 RX ADMIN — HEPARIN SODIUM 2000 UNITS: 1000 INJECTION, SOLUTION INTRAVENOUS; SUBCUTANEOUS at 09:25

## 2020-05-12 RX ADMIN — ONDANSETRON 4 MG: 2 INJECTION INTRAMUSCULAR; INTRAVENOUS at 11:11

## 2020-05-12 RX ADMIN — FENTANYL CITRATE 50 MCG: 50 INJECTION INTRAMUSCULAR; INTRAVENOUS at 10:57

## 2020-05-12 RX ADMIN — PROTAMINE SULFATE 40 MG: 10 INJECTION, SOLUTION INTRAVENOUS at 10:58

## 2020-05-12 RX ADMIN — SUGAMMADEX 200 MG: 100 INJECTION, SOLUTION INTRAVENOUS at 11:24

## 2020-05-12 RX ADMIN — FENTANYL CITRATE 50 MCG: 50 INJECTION INTRAMUSCULAR; INTRAVENOUS at 08:43

## 2020-05-12 RX ADMIN — PROPOFOL 100 MG: 10 INJECTION, EMULSION INTRAVENOUS at 07:49

## 2020-05-12 RX ADMIN — FENTANYL CITRATE 50 MCG: 50 INJECTION INTRAMUSCULAR; INTRAVENOUS at 11:19

## 2020-05-12 RX ADMIN — DEXAMETHASONE SODIUM PHOSPHATE 4 MG: 4 INJECTION, SOLUTION INTRAMUSCULAR; INTRAVENOUS at 07:54

## 2020-05-12 RX ADMIN — ROCURONIUM BROMIDE 25 MG: 10 INJECTION, SOLUTION INTRAVENOUS at 10:57

## 2020-05-12 RX ADMIN — HEPARIN SODIUM 2000 UNITS: 1000 INJECTION, SOLUTION INTRAVENOUS; SUBCUTANEOUS at 10:07

## 2020-05-12 RX ADMIN — HEPARIN SODIUM 4000 UNITS: 1000 INJECTION, SOLUTION INTRAVENOUS; SUBCUTANEOUS at 09:03

## 2020-05-12 RX ADMIN — PROPOFOL 200 MG: 10 INJECTION, EMULSION INTRAVENOUS at 07:48

## 2020-05-12 RX ADMIN — HEPARIN SODIUM 6000 UNITS: 1000 INJECTION, SOLUTION INTRAVENOUS; SUBCUTANEOUS at 08:46

## 2020-05-12 RX ADMIN — FENTANYL CITRATE 50 MCG: 50 INJECTION INTRAMUSCULAR; INTRAVENOUS at 07:48

## 2020-05-12 RX ADMIN — LIDOCAINE HYDROCHLORIDE 100 MG: 20 INJECTION, SOLUTION EPIDURAL; INFILTRATION; INTRACAUDAL; PERINEURAL at 07:48

## 2020-05-12 RX ADMIN — HEPARIN SODIUM 3000 UNITS: 1000 INJECTION, SOLUTION INTRAVENOUS; SUBCUTANEOUS at 09:47

## 2020-05-12 RX ADMIN — ROCURONIUM BROMIDE 25 MG: 10 INJECTION, SOLUTION INTRAVENOUS at 09:08

## 2020-05-12 RX ADMIN — MIDAZOLAM HYDROCHLORIDE 2 MG: 2 INJECTION, SOLUTION INTRAMUSCULAR; INTRAVENOUS at 07:39

## 2020-05-12 ASSESSMENT — PULMONARY FUNCTION TESTS
PIF_VALUE: 20
PIF_VALUE: 19
PIF_VALUE: 18
PIF_VALUE: 21
PIF_VALUE: 19
PIF_VALUE: 17
PIF_VALUE: 16
PIF_VALUE: 18
PIF_VALUE: 30
PIF_VALUE: 21
PIF_VALUE: 2
PIF_VALUE: 22
PIF_VALUE: 21
PIF_VALUE: 20
PIF_VALUE: 21
PIF_VALUE: 17
PIF_VALUE: 17
PIF_VALUE: 21
PIF_VALUE: 21
PIF_VALUE: 19
PIF_VALUE: 23
PIF_VALUE: 16
PIF_VALUE: 21
PIF_VALUE: 22
PIF_VALUE: 20
PIF_VALUE: 14
PIF_VALUE: 21
PIF_VALUE: 21
PIF_VALUE: 1
PIF_VALUE: 18
PIF_VALUE: 22
PIF_VALUE: 22
PIF_VALUE: 18
PIF_VALUE: 20
PIF_VALUE: 21
PIF_VALUE: 18
PIF_VALUE: 20
PIF_VALUE: 2
PIF_VALUE: 17
PIF_VALUE: 18
PIF_VALUE: 21
PIF_VALUE: 21
PIF_VALUE: 17
PIF_VALUE: 22
PIF_VALUE: 1
PIF_VALUE: 17
PIF_VALUE: 20
PIF_VALUE: 19
PIF_VALUE: 22
PIF_VALUE: 20
PIF_VALUE: 17
PIF_VALUE: 22
PIF_VALUE: 20
PIF_VALUE: 17
PIF_VALUE: 25
PIF_VALUE: 15
PIF_VALUE: 20
PIF_VALUE: 21
PIF_VALUE: 18
PIF_VALUE: 18
PIF_VALUE: 16
PIF_VALUE: 20
PIF_VALUE: 19
PIF_VALUE: 3
PIF_VALUE: 17
PIF_VALUE: 18
PIF_VALUE: 21
PIF_VALUE: 21
PIF_VALUE: 19
PIF_VALUE: 19
PIF_VALUE: 22
PIF_VALUE: 19
PIF_VALUE: 14
PIF_VALUE: 19
PIF_VALUE: 21
PIF_VALUE: 17
PIF_VALUE: 1
PIF_VALUE: 19
PIF_VALUE: 20
PIF_VALUE: 16
PIF_VALUE: 22
PIF_VALUE: 18
PIF_VALUE: 20
PIF_VALUE: 19
PIF_VALUE: 17
PIF_VALUE: 17
PIF_VALUE: 20
PIF_VALUE: 15
PIF_VALUE: 1
PIF_VALUE: 17
PIF_VALUE: 19
PIF_VALUE: 14
PIF_VALUE: 21
PIF_VALUE: 17
PIF_VALUE: 22
PIF_VALUE: 21
PIF_VALUE: 19
PIF_VALUE: 19
PIF_VALUE: 17
PIF_VALUE: 17
PIF_VALUE: 18
PIF_VALUE: 20
PIF_VALUE: 18
PIF_VALUE: 18
PIF_VALUE: 1
PIF_VALUE: 18
PIF_VALUE: 19
PIF_VALUE: 19
PIF_VALUE: 20
PIF_VALUE: 19
PIF_VALUE: 2
PIF_VALUE: 20
PIF_VALUE: 21
PIF_VALUE: 1
PIF_VALUE: 19
PIF_VALUE: 17
PIF_VALUE: 18
PIF_VALUE: 29
PIF_VALUE: 19
PIF_VALUE: 18
PIF_VALUE: 22
PIF_VALUE: 22
PIF_VALUE: 17
PIF_VALUE: 1
PIF_VALUE: 17
PIF_VALUE: 20
PIF_VALUE: 21
PIF_VALUE: 19
PIF_VALUE: 20
PIF_VALUE: 18
PIF_VALUE: 21
PIF_VALUE: 19
PIF_VALUE: 15
PIF_VALUE: 18
PIF_VALUE: 17
PIF_VALUE: 16
PIF_VALUE: 21
PIF_VALUE: 20
PIF_VALUE: 18
PIF_VALUE: 20
PIF_VALUE: 21
PIF_VALUE: 21
PIF_VALUE: 22
PIF_VALUE: 20
PIF_VALUE: 21
PIF_VALUE: 17
PIF_VALUE: 19
PIF_VALUE: 17
PIF_VALUE: 19
PIF_VALUE: 19
PIF_VALUE: 21
PIF_VALUE: 19
PIF_VALUE: 14
PIF_VALUE: 26
PIF_VALUE: 19
PIF_VALUE: 17
PIF_VALUE: 20
PIF_VALUE: 22
PIF_VALUE: 14
PIF_VALUE: 19
PIF_VALUE: 20
PIF_VALUE: 18
PIF_VALUE: 21
PIF_VALUE: 22
PIF_VALUE: 18
PIF_VALUE: 22
PIF_VALUE: 21
PIF_VALUE: 21
PIF_VALUE: 20
PIF_VALUE: 15
PIF_VALUE: 16
PIF_VALUE: 19
PIF_VALUE: 21
PIF_VALUE: 13
PIF_VALUE: 21
PIF_VALUE: 21
PIF_VALUE: 15
PIF_VALUE: 22
PIF_VALUE: 18
PIF_VALUE: 22
PIF_VALUE: 21
PIF_VALUE: 22
PIF_VALUE: 19
PIF_VALUE: 17
PIF_VALUE: 19
PIF_VALUE: 17
PIF_VALUE: 17
PIF_VALUE: 21
PIF_VALUE: 1
PIF_VALUE: 18
PIF_VALUE: 22
PIF_VALUE: 18
PIF_VALUE: 19
PIF_VALUE: 18
PIF_VALUE: 20
PIF_VALUE: 22
PIF_VALUE: 16
PIF_VALUE: 21
PIF_VALUE: 19
PIF_VALUE: 21
PIF_VALUE: 15
PIF_VALUE: 18
PIF_VALUE: 13
PIF_VALUE: 20
PIF_VALUE: 19
PIF_VALUE: 18
PIF_VALUE: 18
PIF_VALUE: 19
PIF_VALUE: 21
PIF_VALUE: 21
PIF_VALUE: 3
PIF_VALUE: 19
PIF_VALUE: 19
PIF_VALUE: 20
PIF_VALUE: 18
PIF_VALUE: 19
PIF_VALUE: 19
PIF_VALUE: 20
PIF_VALUE: 5
PIF_VALUE: 21
PIF_VALUE: 14
PIF_VALUE: 14
PIF_VALUE: 18
PIF_VALUE: 20
PIF_VALUE: 19
PIF_VALUE: 19

## 2020-05-12 ASSESSMENT — LIFESTYLE VARIABLES: SMOKING_STATUS: 0

## 2020-05-12 NOTE — ANESTHESIA POSTPROCEDURE EVALUATION
Department of Anesthesiology  Postprocedure Note    Patient: Ruthann Mustafa  MRN: [id number]  YOB: 1985  Date of evaluation: 5/12/2020  Time:  12:56 PM     Procedure Summary     Date:  05/12/20 Room / Location:  [facility] Cath Lab    Anesthesia Start:  7827 Anesthesia Stop:  5128    Procedure:  Thompson Closs A-FIB ABLATION W ANES Diagnosis:      Scheduled Providers:  Dylan Denton MD Responsible Provider:  Rashmi Aj MD    Anesthesia Type:  general ASA Status:  3          Anesthesia Type: general    Howard Phase I:      Howard Phase II:      Last vitals: Reviewed and per EMR flowsheets. Anesthesia Post Evaluation    Patient location: Phase 2.   Level of consciousness: awake and alert  Airway patency: patent  Nausea & Vomiting: no vomiting  Complications: no  Cardiovascular status: blood pressure returned to baseline  Respiratory status: acceptable  Hydration status: euvolemic

## 2020-05-12 NOTE — PRE SEDATION
Sedation Pre-Procedure Note    Patient Name: Niko Figueroa   YOB: 1985  Room/Bed: Room/bed info not found  Medical Record Number: [id number]  Date: 5/12/2020   Time: 11:29 AM       Indication: Atrial fibrillation ablation    Consent: I have discussed with the patient and/or the patient representative the indication, alternatives, and the possible risks and/or complications of the planned procedure and the anesthesia methods. The patient and/or patient representative appear to understand and agree to proceed. Vital Signs:   Vitals:    05/12/20 0625   Temp: 98.9 °F (37.2 °C)       Past Medical History:   has a past medical history of Alcoholism ([facility] and Atrial fibrillation ([facility] Past Surgical History:   has no past surgical history on file. Medications:   Scheduled Meds:   Continuous Infusions:   PRN Meds: HYDROmorphone, HYDROmorphone, fentanNYL, fentanNYL, oxyCODONE-acetaminophen **OR** oxyCODONE-acetaminophen, ondansetron, labetalol, hydrALAZINE  Home Meds:   Prior to Admission medications    Medication Sig Start Date End Date Taking?  Authorizing Provider   atorvastatin (LIPITOR) 80 MG tablet Take 1 tablet by mouth nightly 3/27/20  Yes Aris Michelle MD   dilTIAZem [facility]) 360 MG extended release capsule Take 1 capsule by mouth daily 3/28/20  Yes Aris Michelle MD   folic acid (FOLVITE) 1 MG tablet Take 1 tablet by mouth daily 3/28/20  Yes Aris Michelle MD   Cyanocobalamin (VITAMIN B-12) 50 MCG tablet Take 1 tablet by mouth daily 3/28/20  Yes Aris Michelle MD   vitamin B-1 100 MG tablet Take 1 tablet by mouth daily 3/28/20  Yes Aris Michelle MD   apixaban [facility]) 5 MG TABS tablet Take 1 tablet by mouth 2 times daily 3/28/20  Yes Aris Michelle MD   escitalopram (LEXAPRO) 20 MG tablet Take 20 mg by mouth daily   Yes Historical Provider, MD   Multiple Vitamin (MULTIVITAMIN) tablet Take 1 tablet by mouth daily 3/28/20 4/27/20  Aris Michelle MD     Coumadin Use Last 7 Days:

## 2020-05-15 LAB — POC ACT LR: >400 SEC

## 2020-05-22 ENCOUNTER — HOSPITAL ENCOUNTER (OUTPATIENT)
Dept: VASCULAR LAB | Age: 35
Discharge: HOME | End: 2020-05-22
Payer: COMMERCIAL

## 2020-05-22 ENCOUNTER — TELEPHONE (OUTPATIENT)
Dept: CARDIOLOGY CLINIC | Age: 35
End: 2020-05-22

## 2020-05-22 PROCEDURE — 93926 LOWER EXTREMITY STUDY: CPT

## 2020-05-22 NOTE — TELEPHONE ENCOUNTER
Pt called to report that his right groin is swollen, gradually increasing in size for the past several days. Also, the site has become deeper in purple color everyday. Reports the site is soft, no hard hematoma. S/p ablation on 5/12. Per KV, will send pt to get a stat ultrasound this afternoon to rule out pseudoaneurysm. Ultrasound dept at [facility] aware and can see pt as soon as he can get there. Pt agreeable and on his way.

## 2020-06-01 RX ORDER — ATORVASTATIN CALCIUM 80 MG/1
TABLET, FILM COATED ORAL
Qty: 30 TABLET | Refills: 0 | OUTPATIENT
Start: 2020-06-01

## 2020-06-25 ENCOUNTER — OFFICE VISIT (OUTPATIENT)
Dept: CARDIOLOGY CLINIC | Age: 35
End: 2020-06-25
Payer: COMMERCIAL

## 2020-06-25 VITALS
DIASTOLIC BLOOD PRESSURE: 84 MMHG | HEART RATE: 86 BPM | SYSTOLIC BLOOD PRESSURE: 120 MMHG | BODY MASS INDEX: 25.77 KG/M2 | TEMPERATURE: 97.8 F | WEIGHT: 190 LBS

## 2020-06-25 PROCEDURE — 93000 ELECTROCARDIOGRAM COMPLETE: CPT | Performed by: INTERNAL MEDICINE

## 2020-06-25 PROCEDURE — 99214 OFFICE O/P EST MOD 30 MIN: CPT | Performed by: INTERNAL MEDICINE

## 2020-06-29 ENCOUNTER — HOSPITAL ENCOUNTER (OUTPATIENT)
Dept: SPEECH THERAPY | Age: 35
Setting detail: THERAPIES SERIES
Discharge: HOME | End: 2020-06-29
Payer: COMMERCIAL

## 2020-06-29 PROCEDURE — 92507 TX SP LANG VOICE COMM INDIV: CPT

## 2020-06-30 ENCOUNTER — TELEPHONE (OUTPATIENT)
Dept: CARDIOLOGY CLINIC | Age: 35
End: 2020-06-30

## 2020-06-30 RX ORDER — METOPROLOL SUCCINATE 50 MG/1
50 TABLET, EXTENDED RELEASE ORAL DAILY
Qty: 30 TABLET | Refills: 5 | Status: SHIPPED
Start: 2020-06-30 | End: 2020-11-02 | Stop reason: SINTOL

## 2020-06-30 NOTE — TELEPHONE ENCOUNTER
Pt girlfriend called in and wanted to know what was going on With Pt Diltiazem. It has been on back order and he has now been out for two days. She would like to kow if the pt still needs to be on medication or if he need to have something different.

## 2020-07-02 NOTE — PROGRESS NOTES
NOMS - Motor Speech      Patient: Nolan Crenshaw  : 1985  MRN: [id number]  Date: 2020  Electronically Signed by: Amadeo May MA, CCC-SLP       Note: Individuals who exhibit deficits in speech production may exhibit underlying deficits in respiration, phonation, articulation, prosody, and resonance. In some instances, it may be beneficial to utilize addition FCMs focusing on voice if disordered phonation is a large component. []  Level 1 The individual attempts to speak, but speech cannot be understood by familiar or unfamiliar listeners at any time    []  Level 2 The individual attempts to speak. The communication partner must assume responsibility for interpreting the message, and with consistent and maximal cues the patient can produce short consonant-vowel combinations or automatic words that are rarely intelligible in context    []  Level 3  The communication partner must assume primary responsibility for interpreting the communication exchange, however, the individual is able to produce short consonant-vowel combinations or automatic words intelligibly. With consistent and moderate cueing, the individual can produce simple words and phrases intelligibly, although accuracy may vary    []  Level 4 In simple structured conversation with familiar communication partners, the individual can produce simple words and phrases intelligibly. The individual usually requires moderate cueing in order to produce simple sentences intelligibly although accuracy may vary     [x]  Level 5 The individual is able to speak intelligibly using simple sentences in daily routine activity with both familiar and unfamiliar communication partners.   The individual occasionally requires minimal cueing to produe more complex sentences/messages in routine activities, although accuracy may vary and the individual may occasionally use compensatory strategies    []  Level 6 The individual is successfully able

## 2020-08-25 ENCOUNTER — NURSE ONLY (OUTPATIENT)
Dept: CARDIOLOGY CLINIC | Age: 35
End: 2020-08-25

## 2020-08-26 PROCEDURE — 0296T PR EXT ECG > 48HR TO 21 DAY RCRD W/CONECT INTL RCRD: CPT | Performed by: INTERNAL MEDICINE

## 2020-09-25 PROCEDURE — 0298T PR EXT ECG > 48HR TO 21 DAY REVIEW AND INTERPRETATN: CPT | Performed by: INTERNAL MEDICINE

## 2020-10-07 RX ORDER — PANTOPRAZOLE SODIUM 40 MG/1
TABLET, DELAYED RELEASE ORAL
Qty: 90 TABLET | Refills: 3 | Status: SHIPPED | OUTPATIENT
Start: 2020-10-07 | End: 2021-05-03 | Stop reason: SDUPTHER

## 2020-10-07 NOTE — TELEPHONE ENCOUNTER
Requested Prescriptions     Pending Prescriptions Disp Refills    pantoprazole (PROTONIX) 40 MG tablet [Pharmacy Med Name: PANTOPRAZOLE SOD DR 40 MG TAB] 90 tablet 3     Sig: TAKE ONE TABLET BY MOUTH DAILY                  Last Office Visit: 6/25/2020     Next Office Visit: Visit date not found

## 2020-10-22 ENCOUNTER — TELEPHONE (OUTPATIENT)
Dept: CARDIOLOGY CLINIC | Age: 35
End: 2020-10-22

## 2020-10-23 ENCOUNTER — TELEPHONE (OUTPATIENT)
Dept: CARDIOLOGY CLINIC | Age: 35
End: 2020-10-23

## 2020-10-23 NOTE — TELEPHONE ENCOUNTER
Patient's significant other, Salome Hyde called. Colten Meyer has psoriasis anyway, but she states a side effect of metoprolol is that it can cause guttate psoriasis. She would like to know if he really needs to take the metoprolol, or if something else can be prescribed that would not have these side effects. She can be reached at [phone].

## 2020-10-27 NOTE — TELEPHONE ENCOUNTER
[facility] called back about metoprolol reaction. She can be reached at [phone].

## 2020-10-27 NOTE — TELEPHONE ENCOUNTER
He can go back on the diltiazem if they can get it from his pharmacy. Left message on machine for girlfriend [facility] to return call.

## 2020-11-02 RX ORDER — DILTIAZEM HYDROCHLORIDE 360 MG/1
360 CAPSULE, EXTENDED RELEASE ORAL DAILY
Qty: 30 CAPSULE | Refills: 5 | Status: SHIPPED | OUTPATIENT
Start: 2020-11-02 | End: 2022-04-28 | Stop reason: SDUPTHER

## 2020-11-02 NOTE — TELEPHONE ENCOUNTER
Called and spoke with girlfriend Cesilia Flanagan. Patient has had a worsening of his psoriasis while being on the Toprol. He has since stopped it. I called the pharmacy and they do have the Cardizem CD in stock and will fill his prescription. Advised girlfriend Cesilia Flanagan that the medication is in stock. Unable to leave a message on patient's phone.

## 2020-12-10 NOTE — TELEPHONE ENCOUNTER
[facility] Medication Refills:    Medication: Eliquis    Dosage: 5 mg    Number: 60    Refills: 5    Last Office Visit: 06/25/2020 Cesar Ordonez    Next Office Visit: No future appointment scheduled     Last Refill: 08/10/2020    Last Labs: 05/05/2020 CBC/ BMP/ PT INR

## 2021-03-04 RX ORDER — FLECAINIDE ACETATE 100 MG/1
100 TABLET ORAL 2 TIMES DAILY
Qty: 60 TABLET | Refills: 0 | Status: SHIPPED | OUTPATIENT
Start: 2021-03-04 | End: 2021-06-21 | Stop reason: SDUPTHER

## 2021-03-04 NOTE — TELEPHONE ENCOUNTER
Requested Prescriptions     Pending Prescriptions Disp Refills    flecainide (TAMBOCOR) 100 MG tablet 60 tablet 5          Number: 60    Refills: 0    Last Office Visit: 6/25/2020     Next Office Visit: lvm for appt.      Last lab: 5/5/20

## 2021-03-17 ENCOUNTER — TELEPHONE (OUTPATIENT)
Dept: CARDIOLOGY CLINIC | Age: 36
End: 2021-03-17

## 2021-05-04 RX ORDER — PANTOPRAZOLE SODIUM 40 MG/1
40 TABLET, DELAYED RELEASE ORAL DAILY
Qty: 30 TABLET | Refills: 0 | Status: SHIPPED | OUTPATIENT
Start: 2021-05-04 | End: 2022-04-28

## 2021-05-04 NOTE — TELEPHONE ENCOUNTER
Requested Prescriptions     Pending Prescriptions Disp Refills    pantoprazole (PROTONIX) 40 MG tablet 30 tablet 0     Sig: Take 1 tablet by mouth daily                Last Office Visit: 6/25/2020     Next Office Visit: Visit date not found , left message to schedule f/u appt

## 2021-06-22 RX ORDER — FLECAINIDE ACETATE 100 MG/1
100 TABLET ORAL 2 TIMES DAILY
Qty: 180 TABLET | Refills: 3 | Status: SHIPPED | OUTPATIENT
Start: 2021-06-22 | End: 2022-04-28

## 2021-10-15 ENCOUNTER — TELEPHONE (OUTPATIENT)
Dept: CARDIOLOGY CLINIC | Age: 36
End: 2021-10-15

## 2021-10-15 NOTE — TELEPHONE ENCOUNTER
Pharmacy called to verified patient should be on flecainide  And dilTIAZem. They pts SO thought flecainide  Should have been stopped after hosp.      Please advise [phone]

## 2021-10-15 NOTE — TELEPHONE ENCOUNTER
Patient has not been seen in office in over a year, needs to have follow up to renew medications. Per Dr. Chelsy Zimmerman' most recent note he was to continue flecainide.

## 2021-11-12 RX ORDER — PANTOPRAZOLE SODIUM 40 MG/1
40 TABLET, DELAYED RELEASE ORAL DAILY
Qty: 30 TABLET | Refills: 0 | OUTPATIENT
Start: 2021-11-12

## 2022-04-04 RX ORDER — APIXABAN 5 MG/1
TABLET, FILM COATED ORAL
Qty: 60 TABLET | Refills: 0 | Status: SHIPPED | OUTPATIENT
Start: 2022-04-04 | End: 2022-04-28 | Stop reason: SDUPTHER

## 2022-04-20 NOTE — PROGRESS NOTES
[facility], [place], operated by [facility]   Cardiac Electrophysiology Consultation   Date: 4/28/2022  Reason for Consultation: Follow-up for atrial fibrillation ablation  Consult Requesting Physician: No att. providers found     Chief Complaint:   Chief Complaint   Patient presents with    Follow-up      HPI: Dalia Corona is a 39 y.o. male with PMH  significant for CVA, EtOH use, PAF, highly symptomatic with SOB and palpitations, s/p RFA/PVI of AF (5/2020). Monitor (8/2020) showed no AF. He remains on Eliquis and diltiazem. Interval History: Today, he is here for f/u, last seen in 6/2020. For the past 2 years, he reports very rare palpitations. His only complaint today is elevated BPs, ranging from 130-150s at home. He does admit to having anxiety, but he feels his BPs have been consistently high at home. He is no longer taking flecainide. He is requesting Protonix to be renewed for acid reflux. He received ETOH treatment at [facility] for rehab about 3 months ago. He had been sober for 3 months, but recently had relapse. He ran out of his blood pressure medications including Cardizem for the past 1 week or so. Assessment:   1. PAF, on diltiazem, and Eliquis  2. S/p AF ablation   3. CVA, slowly resolving   4. CMP, mild, EF 50%  5. EtOH abuse    Secondary to history of stroke, I recommend long-term OAC, [id number] score 3. Plan:  1. Continue Eliquis 5 mg BID and diltiazem 360 mg daily  2. Reorder Protonix 40 mg daily  3. Follow up in 1 year with EP NP      Past Medical History:   Diagnosis Date    Alcoholism [facility])     Atrial fibrillation ([facility] [address]         No past surgical history on file. Allergies:  No Known Allergies    Medication:   Prior to Admission medications    Medication Sig Start Date End Date Taking?  Authorizing Provider   ELIQUIS 5 MG TABS tablet TAKE ONE TABLET BY MOUTH TWICE A DAY 4/4/22  Yes ESSENCE Kim - MARIA   dilTIAZem (CARDIZEM CD) 360 MG extended release capsule Take 1 pleuritic pain, SOB, sputum changes or wheezing  · Cardiovascular ROS: Per HPI. · Gastrointestinal ROS: negative for - abdominal pain, blood in stools, diarrhea, hematemesis, melena, nausea/vomiting or swallowing difficulty/pain  · Genito-Urinary ROS: negative for - dysuria or incontinence  · Musculoskeletal ROS: negative for - joint swelling or muscle pain  · Neurological ROS: negative for - confusion, dizziness, gait disturbance, headaches, numbness/tingling, seizures, speech problems, tremors, visual changes or weakness  · Dermatological ROS: negative for - rash    Physical Examination:  Vitals:    04/28/22 1500   BP: (!) 150/110   Pulse:        · Constitutional: Oriented. No distress. · Head: Normocephalic and atraumatic. · Mouth/Throat: Oropharynx is clear and moist.   · Eyes: Conjunctivae normal. EOM are normal.   · Neck: Normal range of motion. Neck supple. No rigidity. No JVD present. · Cardiovascular: Normal rate, regular rhythm, S1&S2 and intact distal pulses. · Pulmonary/Chest: Bilateral respiratory sounds. No wheezes. No rhonchi. · Abdominal: Soft. Bowel sounds present. No distension, No tenderness. · Musculoskeletal: No tenderness. No edema    · Lymphadenopathy: Has no cervical adenopathy. · Neurological: Alert and oriented. Cranial nerve appears intact, No Gross deficit   · Skin: Skin is warm and dry. No rash noted. · Psychiatric: Has a normal mood, affect and behavior     Labs:  Reviewed. ECG: reviewed,     The MCOT, echocardiogram, stress test, and coronary angiography/PCI were reviewed by myself and used for my plan of care. - The patient is counseled to follow a low salt diet to assure blood pressure remains controlled for cardiovascular risk factor modification.   - The patient is counseled to avoid excess caffeine, and energy drinks as this may exacerbated ectopy and arrhythmia.    - The patient is counseled to get regular exercise 3-5 times per week to control cardiovascular risk factors. - The patient is counseled to lose weigt to control cardiovascular risk factors. -The patient is counseled about the health hazards of smoking including cardiovascular side effects and its impact on morbidity and mortality. Thank you for allowing me to participate in the care of Martin Mckay. All questions and concerns were addressed to the patient/family. Alternatives to my treatment were discussed. Ally Powell RN, am scribing for and in the presence of Dr. Loida Flores. 04/28/22 3:10 PM  Srinivasa Lancaster RN    I, Kirstie Miller MD, personally performed the services prescribed in this documentation as scribed by Ms. Srinivasa Lancaster RN in my presence and it is both accurate and complete.        Kirstie Miller MD  Cardiac Electrophysiology  [facility]

## 2022-04-28 ENCOUNTER — OFFICE VISIT (OUTPATIENT)
Dept: CARDIOLOGY CLINIC | Age: 37
End: 2022-04-28
Payer: COMMERCIAL

## 2022-04-28 VITALS
BODY MASS INDEX: 27.94 KG/M2 | DIASTOLIC BLOOD PRESSURE: 110 MMHG | WEIGHT: 206 LBS | HEART RATE: 100 BPM | SYSTOLIC BLOOD PRESSURE: 150 MMHG

## 2022-04-28 DIAGNOSIS — I48.0 PAROXYSMAL ATRIAL FIBRILLATION (HCC): Primary | ICD-10-CM

## 2022-04-28 DIAGNOSIS — I63.9 STROKE ABORTED BY ADMINISTRATION OF THROMBOLYTIC AGENT (HCC): ICD-10-CM

## 2022-04-28 PROCEDURE — 1036F TOBACCO NON-USER: CPT | Performed by: INTERNAL MEDICINE

## 2022-04-28 PROCEDURE — 99214 OFFICE O/P EST MOD 30 MIN: CPT | Performed by: INTERNAL MEDICINE

## 2022-04-28 PROCEDURE — G8419 CALC BMI OUT NRM PARAM NOF/U: HCPCS | Performed by: INTERNAL MEDICINE

## 2022-04-28 PROCEDURE — G8427 DOCREV CUR MEDS BY ELIG CLIN: HCPCS | Performed by: INTERNAL MEDICINE

## 2022-04-28 PROCEDURE — 93000 ELECTROCARDIOGRAM COMPLETE: CPT | Performed by: INTERNAL MEDICINE

## 2022-04-28 RX ORDER — DILTIAZEM HYDROCHLORIDE 360 MG/1
360 CAPSULE, EXTENDED RELEASE ORAL DAILY
Qty: 90 CAPSULE | Refills: 3 | Status: SHIPPED | OUTPATIENT
Start: 2022-04-28

## 2022-04-28 RX ORDER — PANTOPRAZOLE SODIUM 40 MG/1
40 TABLET, DELAYED RELEASE ORAL DAILY
Qty: 90 TABLET | Refills: 3 | Status: SHIPPED | OUTPATIENT
Start: 2022-04-28

## 2023-02-01 RX ORDER — PANTOPRAZOLE SODIUM 40 MG/1
40 TABLET, DELAYED RELEASE ORAL DAILY
Qty: 90 TABLET | Refills: 3 | Status: SHIPPED | OUTPATIENT
Start: 2023-02-01

## 2023-03-27 ENCOUNTER — OFFICE VISIT (OUTPATIENT)
Dept: CARDIOLOGY CLINIC | Age: 38
End: 2023-03-27
Payer: COMMERCIAL

## 2023-03-27 VITALS
HEART RATE: 77 BPM | BODY MASS INDEX: 30.24 KG/M2 | WEIGHT: 223 LBS | SYSTOLIC BLOOD PRESSURE: 128 MMHG | DIASTOLIC BLOOD PRESSURE: 80 MMHG

## 2023-03-27 DIAGNOSIS — I48.0 PAROXYSMAL ATRIAL FIBRILLATION (HCC): Primary | ICD-10-CM

## 2023-03-27 DIAGNOSIS — Z79.01 ON CONTINUOUS ORAL ANTICOAGULATION: ICD-10-CM

## 2023-03-27 PROCEDURE — 93000 ELECTROCARDIOGRAM COMPLETE: CPT | Performed by: NURSE PRACTITIONER

## 2023-03-27 PROCEDURE — 99214 OFFICE O/P EST MOD 30 MIN: CPT | Performed by: NURSE PRACTITIONER

## 2023-03-27 RX ORDER — LANOLIN ALCOHOL/MO/W.PET/CERES
CREAM (GRAM) TOPICAL
COMMUNITY
Start: 2023-03-10

## 2023-03-27 RX ORDER — NALTREXONE HYDROCHLORIDE 50 MG/1
TABLET, FILM COATED ORAL
COMMUNITY
Start: 2023-03-10

## 2023-03-27 RX ORDER — FOLIC ACID 1 MG/1
TABLET ORAL
COMMUNITY
Start: 2023-03-10

## 2023-03-27 NOTE — PROGRESS NOTES
[address]   Electrophysiology  Office Visit  Date: 3/27/2023    Chief Complaint   Patient presents with    Atrial Fibrillation     Cardiac HX: Jose M Corona is a 44 y.o. man with a h/o EtOH abuse, CVA (3/26/2020) w/ new onset AF, s/p RFA/PVI of AF (5/12/2020, Dr. Maricruz Garsia), monitor (8/2020) showed no AF, on Eliquis, dilt. Interval History/HPI: Patient is here for follow-up for paroxysmal atrial fibrillation. Patient was originally seen in March 2020 when he presented with a CVA and was noted to be in atrial fibrillation. His echo at that time showed an EF of 50% with mild left atrial enlargement at 3.6/74. 1. He was placed on flecainide, diltiazem and Eliquis. He did undergo a catheter ablation for atrial fibrillation in May 2020. Postprocedure he wore an outpatient monitor that showed no evidence of atrial fibrillation. He stopped the flecainide and remains on Eliquis and diltiazem. Today he presents in SR. He has not felt any heart racing, palpitations or irregular heartbeats. He was symptomatic with palpitations when he was in atrial fibrillation. He has not felt any breakthrough. He remains on Eliquis 5 mg twice a day with no issues with bleeding or dark tarry stools. He has gone back to rehab and states that he has been sober for 2 months however is still following with outpatient every evening from 6-9. He does occasionally go to AA when he is able. He continues to smoke however he is trying to quit. He did have an echo in 2020 when he was diagnosed with atrial fibrillation. His EF was 50% at that time.     Home medications:   Current Outpatient Medications on File Prior to Visit   Medication Sig Dispense Refill    naltrexone (DEPADE) 50 MG tablet       folic acid (FOLVITE) 1 MG tablet       VITAMIN D, CHOLECALCIFEROL, PO Take by mouth      pantoprazole (PROTONIX) 40 MG tablet Take 1 tablet by mouth daily 90 tablet 3    dilTIAZem (CARDIZEM CD) 360 MG extended release capsule Take 1 no

## 2023-05-22 RX ORDER — PANTOPRAZOLE SODIUM 40 MG/1
40 TABLET, DELAYED RELEASE ORAL DAILY
Qty: 90 TABLET | Refills: 3 | Status: SHIPPED | OUTPATIENT
Start: 2023-05-22

## 2023-05-22 NOTE — TELEPHONE ENCOUNTER
Requested Prescriptions     Pending Prescriptions Disp Refills    pantoprazole (PROTONIX) 40 MG tablet 90 tablet 3     Sig: Take 1 tablet by mouth daily            Last Office Visit: 3/27/2023     Next Office Visit: [phone]

## 2023-07-18 ENCOUNTER — TELEPHONE (OUTPATIENT)
Dept: CARDIOLOGY CLINIC | Age: 38
End: 2023-07-18

## 2023-07-18 ENCOUNTER — OFFICE VISIT (OUTPATIENT)
Dept: CARDIOLOGY CLINIC | Age: 38
End: 2023-07-18
Payer: COMMERCIAL

## 2023-07-18 VITALS
DIASTOLIC BLOOD PRESSURE: 70 MMHG | HEART RATE: 140 BPM | BODY MASS INDEX: 31.06 KG/M2 | WEIGHT: 229 LBS | SYSTOLIC BLOOD PRESSURE: 130 MMHG

## 2023-07-18 DIAGNOSIS — Z86.73 H/O: CVA (CEREBROVASCULAR ACCIDENT): ICD-10-CM

## 2023-07-18 DIAGNOSIS — I48.92 PAROXYSMAL ATRIAL FLUTTER (HCC): ICD-10-CM

## 2023-07-18 DIAGNOSIS — I48.0 PAROXYSMAL ATRIAL FIBRILLATION (HCC): Primary | ICD-10-CM

## 2023-07-18 DIAGNOSIS — Z79.01 ON CONTINUOUS ORAL ANTICOAGULATION: ICD-10-CM

## 2023-07-18 PROCEDURE — 93000 ELECTROCARDIOGRAM COMPLETE: CPT | Performed by: NURSE PRACTITIONER

## 2023-07-18 PROCEDURE — 99215 OFFICE O/P EST HI 40 MIN: CPT | Performed by: NURSE PRACTITIONER

## 2023-07-18 NOTE — TELEPHONE ENCOUNTER
Transesophageal Echocardiogram (MALENA) with External Cardioversion    Date of Procedure:     Time of Arrival:     Cardiologist performing procedure: Dr. Joseph Kwok at [facility] through the main entrance. Check in at the Outpatient Diagnostic desk on the 1st floor. Do not eat or drink anything after midnight the night before the procedure. You may brush your teeth and rinse the morning of the procedure. Take ALL of your routine medications the morning of the procedure. However, if you are taking diabetic medications, please HOLD on the day of the procedure (including insulin). If you take Lantus insulin, take HALF of your usual dose the night before. Do NOT stop taking aspirin, Plavix, coumadin, Eliquis, Xarelto, or Pradaxa (any blood thinners)    Please bring a list of your medications to the hospital with you. Do not apply any lotion, powder, or deodorant the morning of the procedure. You must have someone available to drive you home. It is recommended that you do not drive for 24 hours after the procedure. If you are unable to make this appointment, please call [facility] [address] [facility] at [phone].

## 2023-07-18 NOTE — TELEPHONE ENCOUNTER
Spoke with the patient and got him schedule for procedure. The RN went over prep instructions and he verbalized understanding.        Procedure - MALENA/CV  Date: 7/21/2023  Arrival time: 8:00 am  Procedure time: 9:00 am       Maria R updated/ alerted cath lab [facility])

## 2023-07-18 NOTE — PROGRESS NOTES
[facility]   Electrophysiology  Office Visit  Date: 7/18/2023    Chief Complaint   Patient presents with    Atrial Fibrillation    Atrial Flutter    Tachycardia     Cardiac HX: Marianne Randle is a 43 y.o. man with a h/o EtOH abuse, CVA (3/26/2020) w/ new onset AF, s/p RFA/PVI of AF (5/12/2020, Dr. Manpreet Mock), monitor (8/2020) showed no AF, on Eliquis, dilt. Interval History/HPI: Patient is here for f/u for pAF. Patient had been originally seen in March 2020 when he p/w a CVA and noted to be in AF. Echo showed an EF of 50% w/ mild LAE - 3.6/74. 1. He was placed on flecainide, dilt and Eliquis. He underwent an RFA/PVI of AF in May 2020. Postprocedure he wore an outpatient monitor that showed no evidence of atrial fibrillation. The flecainide was discontinued and he is remained on Eliquis and diltiazem. Today he presents in AF/AFL with a heart rate of 140 bpm.  He states that he works in a [employer] and it is very hot. He also admits to going back to drinking again. He feels that that along with the heat where he works has caused him to be dehydrated and possibly him going back into AF/AFL. He had been seen in the emergency room at [facility] in April 2023 with chest pain and was found to be in AF/RVR. He did convert on his own while he was in the hospital.  He feels that he has been out of rhythm for at least 24 hours. He currently has been taking diltiazem 120 mg daily. He is on Eliquis 5 mg twice a day. He did admit to missing a couple of his p.m. doses. He has had no issues with bleeding or dark tarry stools. He was to have a repeat echo at the last office visit however that has not been done. He does feel heart racing, palpitations and irregular heartbeats. He does feel short of breath when he is out of rhythm. He is denying any chest discomfort, PND, orthopnea or edema. He does have a CPAP for ZINA for which he is noncompliant.       Home medications:   Current Outpatient Medications on File

## 2023-07-18 NOTE — TELEPHONE ENCOUNTER
Per FW, please schedule pt for MALENA/DCCV this Fri. CV is at [facility] on Fri, but [facility] is agreeable. Please schedule with anesthesia. Gave pt instructions at OV earlier today, verbalized understanding. Yee Chand is working on [place] for MALENA. Pt will need f/u with UL after DCCV please.

## 2023-07-19 ENCOUNTER — PREP FOR PROCEDURE (OUTPATIENT)
Dept: CARDIOLOGY CLINIC | Age: 38
End: 2023-07-19

## 2023-07-20 ENCOUNTER — PRE-PROCEDURE TELEPHONE (OUTPATIENT)
Dept: CARDIAC CATH/INVASIVE PROCEDURES | Age: 38
End: 2023-07-20

## 2023-07-21 ENCOUNTER — HOSPITAL ENCOUNTER (OUTPATIENT)
Dept: CARDIAC CATH/INVASIVE PROCEDURES | Age: 38
Discharge: HOME | End: 2023-07-23
Payer: COMMERCIAL

## 2023-07-21 LAB
EKG ATRIAL RATE: 87 BPM
EKG DIAGNOSIS: NORMAL
EKG P AXIS: 60 DEGREES
EKG P-R INTERVAL: 150 MS
EKG Q-T INTERVAL: 378 MS
EKG QRS DURATION: 82 MS
EKG QTC CALCULATION (BAZETT): 454 MS
EKG R AXIS: 74 DEGREES
EKG T AXIS: 62 DEGREES
EKG VENTRICULAR RATE: 87 BPM

## 2023-07-21 PROCEDURE — 93005 ELECTROCARDIOGRAM TRACING: CPT | Performed by: INTERNAL MEDICINE

## 2023-07-21 PROCEDURE — 93010 ELECTROCARDIOGRAM REPORT: CPT | Performed by: INTERNAL MEDICINE

## 2023-07-21 RX ORDER — SODIUM CHLORIDE 0.9 % (FLUSH) 0.9 %
5-40 SYRINGE (ML) INJECTION PRN
Status: DISCONTINUED | OUTPATIENT
Start: 2023-07-21 | End: 2023-07-24 | Stop reason: HOSPADM

## 2023-07-21 RX ORDER — SODIUM CHLORIDE 0.9 % (FLUSH) 0.9 %
5-40 SYRINGE (ML) INJECTION EVERY 12 HOURS SCHEDULED
Status: DISCONTINUED | OUTPATIENT
Start: 2023-07-21 | End: 2023-07-24 | Stop reason: HOSPADM

## 2023-07-21 RX ORDER — SODIUM CHLORIDE, SODIUM LACTATE, POTASSIUM CHLORIDE, CALCIUM CHLORIDE 600; 310; 30; 20 MG/100ML; MG/100ML; MG/100ML; MG/100ML
INJECTION, SOLUTION INTRAVENOUS CONTINUOUS
Status: DISCONTINUED | OUTPATIENT
Start: 2023-07-21 | End: 2023-07-24 | Stop reason: HOSPADM

## 2023-07-21 RX ORDER — LIDOCAINE HYDROCHLORIDE 10 MG/ML
1 INJECTION, SOLUTION EPIDURAL; INFILTRATION; INTRACAUDAL; PERINEURAL
Status: ACTIVE | OUTPATIENT
Start: 2023-07-21 | End: 2023-07-22

## 2023-07-21 NOTE — TELEPHONE ENCOUNTER
The patient called stating the canceled the MALENA/CV because he was in normal sinus rhythm. . The patient would like to know what his next steps are. Please call the patient back at [phone] to advise.

## 2023-07-21 NOTE — TELEPHONE ENCOUNTER
ANGEL LUIS..Spoke with patient, he was inquiring on whether he needed to stay on the increased dose of Diltiazem now that he is back in rhythm. Per Davy's note-he was to stay on this while in afib. Given the return to normal rhythm-he will start taking regular dose of Diltiazem 120mg. He is concerned over returning to work on Monday and being in a very humid/hot atmosphere. He feels he may have been dehydrated as well prior to being seen. He will call us next week with any further updates.

## 2023-08-14 RX ORDER — PANTOPRAZOLE SODIUM 40 MG/1
40 TABLET, DELAYED RELEASE ORAL DAILY
Qty: 90 TABLET | Refills: 3 | Status: SHIPPED | OUTPATIENT
Start: 2023-08-14

## 2023-09-14 NOTE — TELEPHONE ENCOUNTER
Requested Prescriptions     Pending Prescriptions Disp Refills    apixaban (ELIQUIS) 5 MG TABS tablet 180 tablet 3     Sig: TAKE ONE TABLET BY MOUTH TWICE A DAY            Last Office Visit: 7/18/2023     Next Office Visit: Visit date not found

## 2023-09-14 NOTE — TELEPHONE ENCOUNTER
Requested Prescriptions     Pending Prescriptions Disp Refills    pantoprazole (PROTONIX) 40 MG tablet 90 tablet 3     Sig: Take 1 tablet by mouth daily            Last Office Visit: 7/18/2023     Next Office Visit: 9/13/2023

## 2023-09-15 RX ORDER — PANTOPRAZOLE SODIUM 40 MG/1
40 TABLET, DELAYED RELEASE ORAL DAILY
Qty: 90 TABLET | Refills: 3 | Status: SHIPPED | OUTPATIENT
Start: 2023-09-15

## 2024-01-24 ENCOUNTER — HOSPITAL ENCOUNTER (EMERGENCY)
Age: 39
Discharge: HOME | End: 2024-01-24
Attending: EMERGENCY MEDICINE
Payer: COMMERCIAL

## 2024-01-24 VITALS
RESPIRATION RATE: 12 BRPM | TEMPERATURE: 98.6 F | HEART RATE: 89 BPM | HEIGHT: 72 IN | OXYGEN SATURATION: 98 % | SYSTOLIC BLOOD PRESSURE: 156 MMHG | BODY MASS INDEX: 32.59 KG/M2 | WEIGHT: 240.6 LBS | DIASTOLIC BLOOD PRESSURE: 113 MMHG

## 2024-01-24 DIAGNOSIS — F10.90 ALCOHOL USE DISORDER: Primary | ICD-10-CM

## 2024-01-24 DIAGNOSIS — E83.42 HYPOMAGNESEMIA: ICD-10-CM

## 2024-01-24 LAB
ALBUMIN SERPL-MCNC: 4.1 G/DL (ref 3.4–5)
ALBUMIN/GLOB SERPL: 1.3 {RATIO} (ref 1.1–2.2)
ALP SERPL-CCNC: 80 U/L (ref 40–129)
ALT SERPL-CCNC: 15 U/L (ref 10–40)
ANION GAP SERPL CALCULATED.3IONS-SCNC: 15 MMOL/L (ref 3–16)
AST SERPL-CCNC: 43 U/L (ref 15–37)
BASOPHILS # BLD: 0 K/UL (ref 0–0.2)
BASOPHILS NFR BLD: 0.8 %
BILIRUB SERPL-MCNC: 0.9 MG/DL (ref 0–1)
BUN SERPL-MCNC: 7 MG/DL (ref 7–20)
CALCIUM SERPL-MCNC: 10.3 MG/DL (ref 8.3–10.6)
CHLORIDE SERPL-SCNC: 100 MMOL/L (ref 99–110)
CO2 SERPL-SCNC: 21 MMOL/L (ref 21–32)
CREAT SERPL-MCNC: 0.6 MG/DL (ref 0.9–1.3)
DEPRECATED RDW RBC AUTO: 13.5 % (ref 12.4–15.4)
EKG ATRIAL RATE: 97 BPM
EKG DIAGNOSIS: NORMAL
EKG P AXIS: 55 DEGREES
EKG P-R INTERVAL: 148 MS
EKG Q-T INTERVAL: 366 MS
EKG QRS DURATION: 86 MS
EKG QTC CALCULATION (BAZETT): 464 MS
EKG R AXIS: 47 DEGREES
EKG T AXIS: 70 DEGREES
EKG VENTRICULAR RATE: 97 BPM
EOSINOPHIL # BLD: 0.1 K/UL (ref 0–0.6)
EOSINOPHIL NFR BLD: 1.2 %
GFR SERPLBLD CREATININE-BSD FMLA CKD-EPI: >60 ML/MIN/{1.73_M2}
GLUCOSE SERPL-MCNC: 93 MG/DL (ref 70–99)
HCT VFR BLD AUTO: 42.9 % (ref 40.5–52.5)
HGB BLD-MCNC: 15 G/DL (ref 13.5–17.5)
LYMPHOCYTES # BLD: 2 K/UL (ref 1–5.1)
LYMPHOCYTES NFR BLD: 33.9 %
MAGNESIUM SERPL-MCNC: 1.7 MG/DL (ref 1.8–2.4)
MCH RBC QN AUTO: 32.1 PG (ref 26–34)
MCHC RBC AUTO-ENTMCNC: 35 G/DL (ref 31–36)
MCV RBC AUTO: 91.8 FL (ref 80–100)
MONOCYTES # BLD: 0.6 K/UL (ref 0–1.3)
MONOCYTES NFR BLD: 9.3 %
NEUTROPHILS # BLD: 3.3 K/UL (ref 1.7–7.7)
NEUTROPHILS NFR BLD: 54.8 %
PLATELET # BLD AUTO: 324 K/UL (ref 135–450)
PMV BLD AUTO: 8 FL (ref 5–10.5)
POTASSIUM SERPL-SCNC: 4.2 MMOL/L (ref 3.5–5.1)
PROT SERPL-MCNC: 7.3 G/DL (ref 6.4–8.2)
RBC # BLD AUTO: 4.67 M/UL (ref 4.2–5.9)
SODIUM SERPL-SCNC: 136 MMOL/L (ref 136–145)
TROPONIN, HIGH SENSITIVITY: <6 NG/L (ref 0–22)
WBC # BLD AUTO: 6 K/UL (ref 4–11)

## 2024-01-24 PROCEDURE — 96375 TX/PRO/DX INJ NEW DRUG ADDON: CPT

## 2024-01-24 PROCEDURE — 80053 COMPREHEN METABOLIC PANEL: CPT

## 2024-01-24 PROCEDURE — 84484 ASSAY OF TROPONIN QUANT: CPT

## 2024-01-24 PROCEDURE — 96365 THER/PROPH/DIAG IV INF INIT: CPT

## 2024-01-24 PROCEDURE — 6370000000 HC RX 637 (ALT 250 FOR IP): Performed by: EMERGENCY MEDICINE

## 2024-01-24 PROCEDURE — 6360000002 HC RX W HCPCS

## 2024-01-24 PROCEDURE — 83735 ASSAY OF MAGNESIUM: CPT

## 2024-01-24 PROCEDURE — 6360000002 HC RX W HCPCS: Performed by: EMERGENCY MEDICINE

## 2024-01-24 PROCEDURE — 85025 COMPLETE CBC W/AUTO DIFF WBC: CPT

## 2024-01-24 PROCEDURE — 93005 ELECTROCARDIOGRAM TRACING: CPT

## 2024-01-24 PROCEDURE — 99284 EMERGENCY DEPT VISIT MOD MDM: CPT

## 2024-01-24 PROCEDURE — 2580000003 HC RX 258: Performed by: EMERGENCY MEDICINE

## 2024-01-24 PROCEDURE — 96361 HYDRATE IV INFUSION ADD-ON: CPT

## 2024-01-24 RX ORDER — SODIUM CHLORIDE, SODIUM LACTATE, POTASSIUM CHLORIDE, AND CALCIUM CHLORIDE .6; .31; .03; .02 G/100ML; G/100ML; G/100ML; G/100ML
1000 INJECTION, SOLUTION INTRAVENOUS ONCE
Status: COMPLETED | OUTPATIENT
Start: 2024-01-24 | End: 2024-01-24

## 2024-01-24 RX ORDER — THIAMINE HYDROCHLORIDE 100 MG/ML
100 INJECTION, SOLUTION INTRAMUSCULAR; INTRAVENOUS ONCE
Status: COMPLETED | OUTPATIENT
Start: 2024-01-24 | End: 2024-01-24

## 2024-01-24 RX ORDER — MAGNESIUM SULFATE IN WATER 40 MG/ML
2000 INJECTION, SOLUTION INTRAVENOUS ONCE
Status: COMPLETED | OUTPATIENT
Start: 2024-01-24 | End: 2024-01-24

## 2024-01-24 RX ORDER — ONDANSETRON 2 MG/ML
4 INJECTION INTRAMUSCULAR; INTRAVENOUS ONCE
Status: COMPLETED | OUTPATIENT
Start: 2024-01-24 | End: 2024-01-24

## 2024-01-24 RX ORDER — SODIUM CHLORIDE, SODIUM LACTATE, POTASSIUM CHLORIDE, AND CALCIUM CHLORIDE .6; .31; .03; .02 G/100ML; G/100ML; G/100ML; G/100ML
500 INJECTION, SOLUTION INTRAVENOUS ONCE
Status: DISCONTINUED | OUTPATIENT
Start: 2024-01-24 | End: 2024-01-24

## 2024-01-24 RX ORDER — FOLIC ACID 1 MG/1
1 TABLET ORAL DAILY
Status: DISCONTINUED | OUTPATIENT
Start: 2024-01-24 | End: 2024-01-24 | Stop reason: HOSPADM

## 2024-01-24 RX ADMIN — ONDANSETRON 4 MG: 2 INJECTION INTRAMUSCULAR; INTRAVENOUS at 12:00

## 2024-01-24 RX ADMIN — SODIUM CHLORIDE, POTASSIUM CHLORIDE, SODIUM LACTATE AND CALCIUM CHLORIDE 1000 ML: 600; 310; 30; 20 INJECTION, SOLUTION INTRAVENOUS at 10:56

## 2024-01-24 RX ADMIN — FOLIC ACID 1 MG: 1 TABLET ORAL at 12:03

## 2024-01-24 RX ADMIN — THIAMINE HYDROCHLORIDE 100 MG: 100 INJECTION, SOLUTION INTRAMUSCULAR; INTRAVENOUS at 12:00

## 2024-01-24 RX ADMIN — MAGNESIUM SULFATE HEPTAHYDRATE 2000 MG: 40 INJECTION, SOLUTION INTRAVENOUS at 12:51

## 2024-01-24 SDOH — ECONOMIC STABILITY: HOUSING INSECURITY: IN THE LAST 12 MONTHS, HOW MANY PLACES HAVE YOU LIVED?: 1

## 2024-01-24 SDOH — HEALTH STABILITY: PHYSICAL HEALTH: ON AVERAGE, HOW MANY MINUTES DO YOU ENGAGE IN EXERCISE AT THIS LEVEL?: 0 MIN

## 2024-01-24 SDOH — HEALTH STABILITY: PHYSICAL HEALTH: ON AVERAGE, HOW MANY DAYS PER WEEK DO YOU ENGAGE IN MODERATE TO STRENUOUS EXERCISE (LIKE A BRISK WALK)?: 0 DAYS

## 2024-01-24 SDOH — ECONOMIC STABILITY: INCOME INSECURITY: IN THE LAST 12 MONTHS, WAS THERE A TIME WHEN YOU WERE NOT ABLE TO PAY THE MORTGAGE OR RENT ON TIME?: YES

## 2024-01-24 SDOH — ECONOMIC STABILITY: TRANSPORTATION INSECURITY
IN THE PAST 12 MONTHS, HAS THE LACK OF TRANSPORTATION KEPT YOU FROM MEDICAL APPOINTMENTS OR FROM GETTING MEDICATIONS?: NO

## 2024-01-24 SDOH — ECONOMIC STABILITY: TRANSPORTATION INSECURITY
IN THE PAST 12 MONTHS, HAS LACK OF TRANSPORTATION KEPT YOU FROM MEETINGS, WORK, OR FROM GETTING THINGS NEEDED FOR DAILY LIVING?: NO

## 2024-01-24 SDOH — ECONOMIC STABILITY: FOOD INSECURITY: WITHIN THE PAST 12 MONTHS, YOU WORRIED THAT YOUR FOOD WOULD RUN OUT BEFORE YOU GOT MONEY TO BUY MORE.: NEVER TRUE

## 2024-01-24 SDOH — ECONOMIC STABILITY: FOOD INSECURITY: WITHIN THE PAST 12 MONTHS, THE FOOD YOU BOUGHT JUST DIDN'T LAST AND YOU DIDN'T HAVE MONEY TO GET MORE.: NEVER TRUE

## 2024-01-24 SDOH — ECONOMIC STABILITY: HOUSING INSECURITY
IN THE LAST 12 MONTHS, WAS THERE A TIME WHEN YOU DID NOT HAVE A STEADY PLACE TO SLEEP OR SLEPT IN A SHELTER (INCLUDING NOW)?: NO

## 2024-01-24 ASSESSMENT — SOCIAL DETERMINANTS OF HEALTH (SDOH)
HOW OFTEN DO YOU GET TOGETHER WITH FRIENDS OR RELATIVES?: MORE THAN THREE TIMES A WEEK
HOW HARD IS IT FOR YOU TO PAY FOR THE VERY BASICS LIKE FOOD, HOUSING, MEDICAL CARE, AND HEATING?: SOMEWHAT HARD
WITHIN THE LAST YEAR, HAVE YOU BEEN HUMILIATED OR EMOTIONALLY ABUSED IN OTHER WAYS BY YOUR PARTNER OR EX-PARTNER?: NO
ARE YOU MARRIED, WIDOWED, DIVORCED, SEPARATED, NEVER MARRIED, OR LIVING WITH A PARTNER?: NEVER MARRIED
IN A TYPICAL WEEK, HOW MANY TIMES DO YOU TALK ON THE PHONE WITH FAMILY, FRIENDS, OR NEIGHBORS?: MORE THAN THREE TIMES A WEEK
WITHIN THE LAST YEAR, HAVE YOU BEEN KICKED, HIT, SLAPPED, OR OTHERWISE PHYSICALLY HURT BY YOUR PARTNER OR EX-PARTNER?: NO
WITHIN THE LAST YEAR, HAVE YOU BEEN AFRAID OF YOUR PARTNER OR EX-PARTNER?: NO
HOW OFTEN DO YOU ATTEND CHURCH OR RELIGIOUS SERVICES?: NEVER
WITHIN THE LAST YEAR, HAVE TO BEEN RAPED OR FORCED TO HAVE ANY KIND OF SEXUAL ACTIVITY BY YOUR PARTNER OR EX-PARTNER?: NO
HOW OFTEN DO YOU ATTENT MEETINGS OF THE CLUB OR ORGANIZATION YOU BELONG TO?: NEVER
DO YOU BELONG TO ANY CLUBS OR ORGANIZATIONS SUCH AS CHURCH GROUPS UNIONS, FRATERNAL OR ATHLETIC GROUPS, OR SCHOOL GROUPS?: NO

## 2024-01-24 ASSESSMENT — PAIN - FUNCTIONAL ASSESSMENT: PAIN_FUNCTIONAL_ASSESSMENT: NONE - DENIES PAIN

## 2024-01-24 ASSESSMENT — LIFESTYLE VARIABLES
HOW MANY STANDARD DRINKS CONTAINING ALCOHOL DO YOU HAVE ON A TYPICAL DAY: 10 OR MORE
HOW OFTEN DO YOU HAVE A DRINK CONTAINING ALCOHOL: 4 OR MORE TIMES A WEEK

## 2024-01-24 ASSESSMENT — PATIENT HEALTH QUESTIONNAIRE - PHQ9
1. LITTLE INTEREST OR PLEASURE IN DOING THINGS: SEVERAL DAYS
2. FEELING DOWN, DEPRESSED OR HOPELESS: SEVERAL DAYS
SUM OF ALL RESPONSES TO PHQ9 QUESTIONS 1 & 2: 2

## 2024-01-24 ASSESSMENT — ENCOUNTER SYMPTOMS
ABDOMINAL PAIN: 0
COUGH: 0
WHEEZING: 0
ABDOMINAL DISTENTION: 0

## 2024-01-24 NOTE — DISCHARGE INSTRUCTIONS
-please follow up with your PCP as soon as possible   -please call [phone] for help with alcohol addiction   -please continue to take all your medications as prescribed by your PCP

## 2024-01-24 NOTE — ED NOTES
[facility]. Patient reports that he relapsed five days ago, drinking a half gallon on alcohol. Patient reports that he has been to [facility] twice, he has participated in [facility]'s programs, and has been to  [facility] for a 30 Day stay. He generally feels that he knows what programs and what they provide. He does not feel he necessarily needs an alcohol treatment program, again,  but feels he needs to see a Behavioral Health Specialist, for depression. Patient aware of his insurance being a medicaid product, and that he would need to get resources and approval through his medicaid  with [place]. Patient was willing to accept information regarding other alcohol treatment programs, but he does not want to commit to anything at this time, as his main concern at this time is to get a job. He reports that his parents are assisting him financially at this time, but he knows that will not be for long. SBIRT and SDOH screenings completed. Patient provided with resources for rent/mortgage and utilities assistance.Also provided additional resources for etoh treatment.       Atiya Chen, [facility]  01/24/24 1328       Atiya Chen, [facility] [place]  01/24/24 1333       Atiya Chen, [facility] [place]  01/24/24 1341

## 2024-01-24 NOTE — CARE COORDINATION
1:21 PM   Per [facility] note, she met with pt to discuss rehab options and pt has declined rehab at this time. She provided resource paperwork to pt.     SW signing off, please call if additional services are needed.     Electronically signed by CHEYANNE Moreno, LSW on 1/24/2024 at 1:22 PM  [phone]

## 2024-01-24 NOTE — ED PROVIDER NOTES
ED Attending Attestation Note     Date of evaluation: 1/24/2024    This patient was seen by the resident.  I have seen and examined the patient, agree with the workup, evaluation, management and diagnosis. The care plan has been discussed.      Briefly, Rakan Anne is a 38 y.o. male with a PMH inclusive of alcohol use disorder who presents for evaluation of racing heart and nausea.  Patient had been abstinent from alcohol for the past couple of months and then relapsed 5 days ago.  Has been drinking 1/5 of liquor for the last 5 days.  Last drink 24 hours ago.  Since stopping drinking he has had some nausea with stomach upset and some mild tachycardia.  With his history of atrial fibrillation and stroke he was concerned so presented to the ED.    Denies complicated withdrawal including history of seizures or DTs previously.  Had previously been on naltrexone and Antabuse but stopped taking these.    Notable exam findings include mild tachycardia, no focal abdominal tenderness, no tremors or tongue fasciculations, normal mental status    Assessment/ Medical Decision Making:     Patient with some mild symptoms of withdrawal currently including GI upset, diastolic hypertension and mild tachycardia which may also be secondary to dehydration.  Will give IV fluids, check labs, reassess symptoms.  Will consult  to help with addiction resources.  He is also established with a primary care provider.    Clinical impression: Alcohol use disorder, hypomagnesemia     Tete Orellana MD  01/24/24 1038       Tete Orellana MD  01/26/24 1213    
magnesium was slightly low 1.7 we are repleting it.  Upon discussing with patient he states that he has been irregular with his medications and specific diltiazem and Eliquis.  We emphasized the importance of taking these medications regularly.  I offered to give prescription for Eliquis and diltiazem in case he has run out I also offered to give prescription for his antiabuse as well as naltrexone however he stated that he had himself stopped taking dose medications in order to drink.  He states that his depression is a trigger for his binge drinking.  I anticipate that was the trigger for possible A-fib RVR.  I did look at his Apple Watch monitoring and was unable to describe the rhythm however did review a record of a heart rate of 120.  On my assessment patient CIWA was of 3.  I do not anticipate he is in moderate to severe withdrawal at this time.  We also consulted  to provide with rehab referral resources.    Is this patient to be included in the SEP-1 core measure? No Exclusion criteria - the patient is NOT to be included for SEP-1 Core Measure due to: Infection is not suspected    Medical Decision Making  Amount and/or Complexity of Data Reviewed  Labs: ordered.  ECG/medicine tests: ordered.    Risk  Prescription drug management.        This patient was also evaluated by the attending physician. All care plans were discussed and agreed upon.    Clinical Impression     1. Hypomagnesemia        Disposition     PATIENT REFERRED TO:  Thierry Cardoza MD  [address]  [address]  [place] [address]  [phone]    In 1 week        DISCHARGE MEDICATIONS:  Discharge Medication List as of 1/24/2024  2:02 PM          DISPOSITION Decision To Discharge 01/24/2024 01:39:25 PM  HOME      Diagnostic Results and Other Data       RADIOLOGY:  No orders to display       LABS:   Results for orders placed or performed during the hospital encounter of 01/24/24   Magnesium   Result Value Ref Range

## 2024-05-11 ENCOUNTER — HOSPITAL ENCOUNTER (EMERGENCY)
Age: 39
Discharge: HOME | End: 2024-05-11
Attending: EMERGENCY MEDICINE
Payer: COMMERCIAL

## 2024-05-11 VITALS
WEIGHT: 224.1 LBS | DIASTOLIC BLOOD PRESSURE: 105 MMHG | HEART RATE: 95 BPM | TEMPERATURE: 98 F | RESPIRATION RATE: 16 BRPM | OXYGEN SATURATION: 97 % | BODY MASS INDEX: 30.39 KG/M2 | SYSTOLIC BLOOD PRESSURE: 155 MMHG

## 2024-05-11 DIAGNOSIS — B02.9 HERPES ZOSTER WITHOUT COMPLICATION: Primary | ICD-10-CM

## 2024-05-11 PROCEDURE — 99283 EMERGENCY DEPT VISIT LOW MDM: CPT

## 2024-05-11 PROCEDURE — 6370000000 HC RX 637 (ALT 250 FOR IP): Performed by: EMERGENCY MEDICINE

## 2024-05-11 RX ORDER — VALACYCLOVIR HYDROCHLORIDE 1 G/1
1000 TABLET, FILM COATED ORAL 3 TIMES DAILY
Qty: 21 TABLET | Refills: 0 | Status: SHIPPED | OUTPATIENT
Start: 2024-05-11 | End: 2024-05-18

## 2024-05-11 RX ORDER — ACYCLOVIR 200 MG/1
800 CAPSULE ORAL ONCE
Status: COMPLETED | OUTPATIENT
Start: 2024-05-11 | End: 2024-05-11

## 2024-05-11 RX ADMIN — ACYCLOVIR 800 MG: 200 CAPSULE ORAL at 19:52

## 2024-05-11 ASSESSMENT — PAIN - FUNCTIONAL ASSESSMENT: PAIN_FUNCTIONAL_ASSESSMENT: 0-10

## 2024-05-11 ASSESSMENT — PAIN SCALES - GENERAL: PAINLEVEL_OUTOF10: 2

## 2024-05-11 NOTE — DISCHARGE INSTRUCTIONS
Keep rash covered around other people. Avoid contact with infants, pregnant people or people who are immunocompromised like receiving chemotherapy. Return for fever, severe headache, rash spreading outside region of chest and back or other worsening symptoms

## 2024-05-11 NOTE — ED NOTES
Reviewed AVS, prescriptions, and follow up information. Pt verbalizes understanding. Alert and oriented with steady gait observed at time of discharge.

## 2024-05-11 NOTE — ED PROVIDER NOTES
[facility] EMERGENCY DEPT VISIT      Patient Identification  Rakan Anne is a 39 y.o. male.    Chief Complaint   Rash (Pt with small area of blisters to left anterior chest since Tuesday. Pt's dad recently had shingles.)      History of Present Illness:    History was obtained from patient.  Limitations to history:none  This is a  39 y.o. male who presents ambulatory  to the ED with complaints of rash to his left chest for last 3 days. Started out as itching in this region and rash came several hours later. No new soaps, detergents, contact with poison ivy, metal products or other allergen exposures. Has not developed rash anywhere else. Not painful. No fever. Brother had shingles recently but he was not around him much. Patient has had chicken pox in past.     Past Medical History:   Diagnosis Date    Alcoholism (HCC)     Atrial fibrillation (HCC)        Past Surgical History:   Procedure Laterality Date    CARDIAC ELECTROPHYSIOLOGY MAPPING AND ABLATION         No current facility-administered medications for this encounter.    Current Outpatient Medications:     valACYclovir (VALTREX) 1 g tablet, Take 1 tablet by mouth 3 times daily for 7 days, Disp: 21 tablet, Rfl: 0    apixaban (ELIQUIS) 5 MG TABS tablet, TAKE ONE TABLET BY MOUTH TWICE A DAY, Disp: 180 tablet, Rfl: 3    pantoprazole (PROTONIX) 40 MG tablet, Take 1 tablet by mouth daily, Disp: 90 tablet, Rfl: 3    naltrexone (DEPADE) 50 MG tablet, , Disp: , Rfl:     thiamine 100 MG tablet, , Disp: , Rfl:     folic acid (FOLVITE) 1 MG tablet, , Disp: , Rfl:     VITAMIN D, CHOLECALCIFEROL, PO, Take by mouth, Disp: , Rfl:     dilTIAZem (CARDIZEM CD) 360 MG extended release capsule, Take 1 capsule by mouth daily, Disp: 90 capsule, Rfl: 3    Multiple Vitamin (MULTIVITAMIN) tablet, Take 1 tablet by mouth daily, Disp: 30 tablet, Rfl: 1    escitalopram (LEXAPRO) 20 MG tablet, Take 1 tablet by mouth daily, Disp: , Rfl:     No Known Allergies    Social History  %.    Prescriptions given:   Discharge Medication List as of 5/11/2024  7:50 PM        START taking these medications    Details   valACYclovir (VALTREX) 1 g tablet Take 1 tablet by mouth 3 times daily for 7 days, Disp-21 tablet, R-0Normal               This chart was created using dragon voice recognition software.        Maite De Leon MD  05/12/24 0606

## 2024-05-31 NOTE — PROGRESS NOTES
[address]   Cardiac Electrophysiology Consultation   Date: 6/25/2020  Reason for Consultation: Follow-up for atrial fibrillation ablation  Consult Requesting Physician: No att. providers found     Chief Complaint:   Chief Complaint   Patient presents with    Atrial Fibrillation        HPI: Braxton Valenzuela is a 38 y.o. gentleman with a past medical history significant for CVA, EtOH use, atrial fibrillation, status post atrial fibrillation ablation by me, 4 weeks ago is coming in for post ablation follow-up. During his atrial fibrillation episodes, he was highly symptomatic with shortness of breath and palpitations. He also had occasional lightheadedness with his episodes of atrial fibrillation. His 14-day monitor showed 38% atrial fibrillation burden. During A. fib, his rate went up to 216 bpm.  Secondary to highly symptomatic and drug refractory atrial fibrillation, he underwent atrial fibrillation ablation by me, 4 weeks ago. Today, he is coming in for follow-up. After his ablation, patient felt very well without any significant recurrence of palpitations, shortness of breath or lightheadedness. He have 2 episodes of very short-lived palpitations lasting for less than 5 seconds. After few days of ablation, he also have a right groin swelling and hence he had ultrasound Doppler which did not show any evidence of pseudoaneurysm or hematoma. His symptoms in the right groin was completely resolved. Past Medical History:   Diagnosis Date    Alcoholism [facility])     Atrial fibrillation ([address]         No past surgical history on file. Allergies:  No Known Allergies    Medication:   Prior to Admission medications    Medication Sig Start Date End Date Taking?  Authorizing Provider   flecainide (TAMBOCOR) 100 MG tablet Take 1 tablet by mouth every 12 hours 5/12/20   Macie Azar MD   pantoprazole (PROTONIX) 40 MG tablet Take 1 tablet by mouth daily 5/12/20   Macie Azar Patients spouse Simona called. Robert needs a refill on rosuvastin 40 mg/ [facility] [place].   MD   atorvastatin (LIPITOR) 80 MG tablet Take 1 tablet by mouth nightly 3/27/20   Iraida Bazan MD   dilTIAZem [facility] CD) 360 MG extended release capsule Take 1 capsule by mouth daily 3/28/20   Iraida Bazan MD   folic acid (FOLVITE) 1 MG tablet Take 1 tablet by mouth daily 3/28/20   Iraida Bazan MD   Cyanocobalamin (VITAMIN B-12) 50 MCG tablet Take 1 tablet by mouth daily 3/28/20   Iraida Bazan MD   vitamin B-1 100 MG tablet Take 1 tablet by mouth daily 3/28/20   Iraida Bazan MD   Multiple Vitamin (MULTIVITAMIN) tablet Take 1 tablet by mouth daily 3/28/20 4/27/20  Iraida Bazan MD   apixaban [facility]) 5 MG TABS tablet Take 1 tablet by mouth 2 times daily 3/28/20   Iraida Bazan MD   escitalopram (LEXAPRO) 20 MG tablet Take 20 mg by mouth daily    Historical Provider, MD       Social History:   has an unknown smoking status. He has never used smokeless tobacco.        Family History:  family history is not on file. Reviewed. Denies family history of sudden cardiac death, arrhythmia, premature CAD    Review of System:    · General ROS: negative for - chills, fever   · Psychological ROS: negative for - anxiety or depression  · Ophthalmic ROS: negative for - eye pain or loss of vision  · ENT ROS: negative for - epistaxis, headaches, nasal discharge, sore throat   · Allergy and Immunology ROS: negative for - hives, nasal congestion   · Hematological and Lymphatic ROS: negative for - bleeding problems, blood clots, bruising or jaundice  · Endocrine ROS: negative for - skin changes, temperature intolerance or unexpected weight changes  · Respiratory ROS: negative for - cough, hemoptysis, pleuritic pain, SOB, sputum changes or wheezing  · Cardiovascular ROS: Per HPI.    · Gastrointestinal ROS: negative for - abdominal pain, blood in stools, diarrhea, hematemesis, melena, nausea/vomiting or swallowing difficulty/pain  · Genito-Urinary ROS: negative for - dysuria or incontinence  · Musculoskeletal ROS: negative for - joint swelling or muscle pain  · Neurological ROS: negative for - confusion, dizziness, gait disturbance, headaches, numbness/tingling, seizures, speech problems, tremors, visual changes or weakness  · Dermatological ROS: negative for - rash    Physical Examination:  Vitals:    06/25/20 1406   BP: 120/84   Pulse: 86   Temp: 97.8 °F (36.6 °C)       · Constitutional: Oriented. No distress. · Head: Normocephalic and atraumatic. · Mouth/Throat: Oropharynx is clear and moist.   · Eyes: Conjunctivae normal. EOM are normal.   · Neck: Normal range of motion. Neck supple. No rigidity. No JVD present. · Cardiovascular: Normal rate, regular rhythm, S1&S2 and intact distal pulses. · Pulmonary/Chest: Bilateral respiratory sounds. No wheezes. No rhonchi. · Abdominal: Soft. Bowel sounds present. No distension, No tenderness. · Musculoskeletal: No tenderness. No edema    · Lymphadenopathy: Has no cervical adenopathy. · Neurological: Alert and oriented. Cranial nerve appears intact, No Gross deficit   · Skin: Skin is warm and dry. No rash noted. · Psychiatric: Has a normal mood, affect and behavior     Labs:  Reviewed. ECG: reviewed, normal sinus rhythm    The MCOT, echocardiogram, stress test, and coronary angiography/PCI were reviewed by myself and used for my plan of care. Assessment/Plan:     1. Paroxysmal atrial fibrillation, status post atrial fibrillation ablation by me  2. CVA, slowly resolving   3 EtOH abuse      Since his ablation, patient is doing very well. Before ablation, his A. fib burden was 38% with poorly controlled ventricular rate. He also had a mild reduction in his LV ejection fraction to 50% possibly secondary to atrial fibrillation with rapid ventricular response. He underwent a successful atrial fibrillation ablation and coming in for follow-up. Currently, he stays in normal sinus rhythm.      Continue his Eliquis 5 mg p.o. twice daily, Cardizem 360 mg daily and

## 2024-08-19 RX ORDER — PANTOPRAZOLE SODIUM 40 MG/1
40 TABLET, DELAYED RELEASE ORAL DAILY
Qty: 30 TABLET | Refills: 0 | Status: SHIPPED | OUTPATIENT
Start: 2024-08-19

## 2024-08-20 ENCOUNTER — OFFICE VISIT (OUTPATIENT)
Dept: FAMILY MEDICINE CLINIC | Age: 39
End: 2024-08-20
Payer: COMMERCIAL

## 2024-08-20 VITALS
DIASTOLIC BLOOD PRESSURE: 74 MMHG | BODY MASS INDEX: 30.85 KG/M2 | OXYGEN SATURATION: 98 % | HEART RATE: 80 BPM | SYSTOLIC BLOOD PRESSURE: 138 MMHG | WEIGHT: 227.8 LBS | HEIGHT: 72 IN

## 2024-08-20 DIAGNOSIS — F10.10 ALCOHOL ABUSE: Primary | ICD-10-CM

## 2024-08-20 DIAGNOSIS — F32.A ANXIETY AND DEPRESSION: ICD-10-CM

## 2024-08-20 DIAGNOSIS — I48.0 PAROXYSMAL ATRIAL FIBRILLATION (HCC): ICD-10-CM

## 2024-08-20 DIAGNOSIS — F41.9 ANXIETY AND DEPRESSION: ICD-10-CM

## 2024-08-20 PROCEDURE — 4004F PT TOBACCO SCREEN RCVD TLK: CPT | Performed by: STUDENT IN AN ORGANIZED HEALTH CARE EDUCATION/TRAINING PROGRAM

## 2024-08-20 PROCEDURE — G8427 DOCREV CUR MEDS BY ELIG CLIN: HCPCS | Performed by: STUDENT IN AN ORGANIZED HEALTH CARE EDUCATION/TRAINING PROGRAM

## 2024-08-20 PROCEDURE — G8417 CALC BMI ABV UP PARAM F/U: HCPCS | Performed by: STUDENT IN AN ORGANIZED HEALTH CARE EDUCATION/TRAINING PROGRAM

## 2024-08-20 PROCEDURE — 99203 OFFICE O/P NEW LOW 30 MIN: CPT | Performed by: STUDENT IN AN ORGANIZED HEALTH CARE EDUCATION/TRAINING PROGRAM

## 2024-08-20 RX ORDER — NALTREXONE HYDROCHLORIDE 50 MG/1
50 TABLET, FILM COATED ORAL DAILY
Qty: 90 TABLET | Refills: 1 | Status: SHIPPED | OUTPATIENT
Start: 2024-08-20

## 2024-08-20 SDOH — HEALTH STABILITY: PHYSICAL HEALTH: ON AVERAGE, HOW MANY MINUTES DO YOU ENGAGE IN EXERCISE AT THIS LEVEL?: PATIENT DECLINED

## 2024-08-20 SDOH — HEALTH STABILITY: PHYSICAL HEALTH: ON AVERAGE, HOW MANY DAYS PER WEEK DO YOU ENGAGE IN MODERATE TO STRENUOUS EXERCISE (LIKE A BRISK WALK)?: 7 DAYS

## 2024-08-20 ASSESSMENT — PATIENT HEALTH QUESTIONNAIRE - PHQ9
9. THOUGHTS THAT YOU WOULD BE BETTER OFF DEAD, OR OF HURTING YOURSELF: NOT AT ALL
6. FEELING BAD ABOUT YOURSELF - OR THAT YOU ARE A FAILURE OR HAVE LET YOURSELF OR YOUR FAMILY DOWN: SEVERAL DAYS
2. FEELING DOWN, DEPRESSED OR HOPELESS: SEVERAL DAYS
SUM OF ALL RESPONSES TO PHQ9 QUESTIONS 1 & 2: 3
SUM OF ALL RESPONSES TO PHQ QUESTIONS 1-9: 9
4. FEELING TIRED OR HAVING LITTLE ENERGY: MORE THAN HALF THE DAYS
SUM OF ALL RESPONSES TO PHQ QUESTIONS 1-9: 9
1. LITTLE INTEREST OR PLEASURE IN DOING THINGS: MORE THAN HALF THE DAYS
10. IF YOU CHECKED OFF ANY PROBLEMS, HOW DIFFICULT HAVE THESE PROBLEMS MADE IT FOR YOU TO DO YOUR WORK, TAKE CARE OF THINGS AT HOME, OR GET ALONG WITH OTHER PEOPLE: SOMEWHAT DIFFICULT
7. TROUBLE CONCENTRATING ON THINGS, SUCH AS READING THE NEWSPAPER OR WATCHING TELEVISION: SEVERAL DAYS
SUM OF ALL RESPONSES TO PHQ QUESTIONS 1-9: 9
5. POOR APPETITE OR OVEREATING: SEVERAL DAYS
SUM OF ALL RESPONSES TO PHQ QUESTIONS 1-9: 9
3. TROUBLE FALLING OR STAYING ASLEEP: SEVERAL DAYS
8. MOVING OR SPEAKING SO SLOWLY THAT OTHER PEOPLE COULD HAVE NOTICED. OR THE OPPOSITE, BEING SO FIGETY OR RESTLESS THAT YOU HAVE BEEN MOVING AROUND A LOT MORE THAN USUAL: NOT AT ALL

## 2024-08-20 ASSESSMENT — ANXIETY QUESTIONNAIRES
4. TROUBLE RELAXING: SEVERAL DAYS
5. BEING SO RESTLESS THAT IT IS HARD TO SIT STILL: NOT AT ALL
6. BECOMING EASILY ANNOYED OR IRRITABLE: SEVERAL DAYS
2. NOT BEING ABLE TO STOP OR CONTROL WORRYING: SEVERAL DAYS
GAD7 TOTAL SCORE: 7
3. WORRYING TOO MUCH ABOUT DIFFERENT THINGS: SEVERAL DAYS
7. FEELING AFRAID AS IF SOMETHING AWFUL MIGHT HAPPEN: SEVERAL DAYS
1. FEELING NERVOUS, ANXIOUS, OR ON EDGE: MORE THAN HALF THE DAYS

## 2024-08-20 NOTE — PROGRESS NOTES
the Last Year: No        Family History   Problem Relation Age of Onset    Arthritis Mother     Stroke Mother     Psoriasis Brother        PE  Vitals:    08/20/24 1511   BP: 138/74   Pulse: 80   SpO2: 98%   Weight: 103.3 kg (227 lb 12.8 oz)   Height: 1.829 m (6')     Estimated body mass index is 30.9 kg/m² as calculated from the following:    Height as of this encounter: 1.829 m (6').    Weight as of this encounter: 103.3 kg (227 lb 12.8 oz).    Physical Exam  Vitals reviewed.   Constitutional:       General: He is not in acute distress.     Appearance: Normal appearance. He is not ill-appearing, toxic-appearing or diaphoretic.   HENT:      Mouth/Throat:      Mouth: Mucous membranes are moist.   Eyes:      General: No scleral icterus.     Conjunctiva/sclera: Conjunctivae normal.   Cardiovascular:      Rate and Rhythm: Normal rate.   Pulmonary:      Effort: Pulmonary effort is normal.   Abdominal:      General: There is no distension.   Skin:     General: Skin is dry.      Coloration: Skin is not jaundiced or pale.   Neurological:      Mental Status: He is alert. Mental status is at baseline.      Gait: Gait normal.   Psychiatric:         Behavior: Behavior normal.         Thought Content: Thought content normal.           There is no immunization history on file for this patient.    Health Maintenance   Topic Date Due    Pneumococcal 0-64 years Vaccine (1 of 2 - PCV) Never done    Depression Screen  Never done    Varicella vaccine (1 of 2 - 13+ 2-dose series) Never done    HIV screen  Never done    Hepatitis C screen  Never done    Hepatitis B vaccine (1 of 3 - 19+ 3-dose series) Never done    COVID-19 Vaccine (1 - 2023-24 season) Never done    HPV vaccine (2 - 3-dose SCDM series) 11/19/2023    Flu vaccine (1) Never done    Lipids  03/26/2025    DTaP/Tdap/Td vaccine (3 - Td or Tdap) 10/25/2033    Hepatitis A vaccine  Aged Out    Hib vaccine  Aged Out    Polio vaccine  Aged Out    Meningococcal (ACWY) vaccine  Aged

## 2024-10-07 RX ORDER — DILTIAZEM HYDROCHLORIDE 360 MG/1
360 CAPSULE, EXTENDED RELEASE ORAL DAILY
Qty: 90 CAPSULE | Refills: 0 | Status: SHIPPED | OUTPATIENT
Start: 2024-10-07 | End: 2024-10-11 | Stop reason: SDUPTHER

## 2024-10-07 NOTE — TELEPHONE ENCOUNTER
Requested Prescriptions     Pending Prescriptions Disp Refills    dilTIAZem (CARDIZEM CD) 360 MG extended release capsule 90 capsule 3     Sig: Take 1 capsule by mouth daily            Checked Correct Pharmacy: Yes    Any changes since last refill? No     Number: 90    Refills: 3    Last Office Visit: 7/18/2023     Next Office Visit: 10/15/2024         Last Labs: 01.24.2024

## 2024-10-11 RX ORDER — DILTIAZEM HYDROCHLORIDE 360 MG/1
360 CAPSULE, EXTENDED RELEASE ORAL DAILY
Qty: 90 CAPSULE | Refills: 2 | Status: SHIPPED | OUTPATIENT
Start: 2024-10-11

## 2024-10-11 NOTE — TELEPHONE ENCOUNTER
Requested Prescriptions     Pending Prescriptions Disp Refills    dilTIAZem (CARDIZEM CD) 360 MG extended release capsule 90 capsule 0     Sig: Take 1 capsule by mouth daily     Last Office Visit: 7/18/2023     Next Office Visit: 10/15/2024       Per Davy SIFUENTES last note :  On dilt 120 mg QD - increase to 240 mg QD while in AF

## 2024-11-19 RX ORDER — PANTOPRAZOLE SODIUM 40 MG/1
40 TABLET, DELAYED RELEASE ORAL DAILY
Qty: 30 TABLET | Refills: 0 | Status: SHIPPED | OUTPATIENT
Start: 2024-11-19

## 2024-11-19 NOTE — TELEPHONE ENCOUNTER
Requested Prescriptions     Pending Prescriptions Disp Refills    pantoprazole (PROTONIX) 40 MG tablet 30 tablet 0     Sig: Take 1 tablet by mouth daily            Checked Correct Pharmacy: Yes    Any changes since last refill? No     Number: 30    Refills: 0    Last Office Visit: 7/18/2023     Next Office Visit: 12/3/2024         Last Labs: 01.24.2024

## 2024-12-03 ENCOUNTER — OFFICE VISIT (OUTPATIENT)
Dept: CARDIOLOGY CLINIC | Age: 39
End: 2024-12-03
Payer: COMMERCIAL

## 2024-12-03 VITALS
SYSTOLIC BLOOD PRESSURE: 130 MMHG | BODY MASS INDEX: 31.69 KG/M2 | HEART RATE: 78 BPM | DIASTOLIC BLOOD PRESSURE: 88 MMHG | HEIGHT: 72 IN | WEIGHT: 234 LBS

## 2024-12-03 DIAGNOSIS — I63.9 CEREBROVASCULAR ACCIDENT (CVA), UNSPECIFIED MECHANISM (HCC): ICD-10-CM

## 2024-12-03 DIAGNOSIS — I48.0 PAROXYSMAL ATRIAL FIBRILLATION (HCC): Primary | ICD-10-CM

## 2024-12-03 PROCEDURE — G8427 DOCREV CUR MEDS BY ELIG CLIN: HCPCS | Performed by: NURSE PRACTITIONER

## 2024-12-03 PROCEDURE — 4004F PT TOBACCO SCREEN RCVD TLK: CPT | Performed by: NURSE PRACTITIONER

## 2024-12-03 PROCEDURE — G8484 FLU IMMUNIZE NO ADMIN: HCPCS | Performed by: NURSE PRACTITIONER

## 2024-12-03 PROCEDURE — 93000 ELECTROCARDIOGRAM COMPLETE: CPT | Performed by: NURSE PRACTITIONER

## 2024-12-03 PROCEDURE — G8417 CALC BMI ABV UP PARAM F/U: HCPCS | Performed by: NURSE PRACTITIONER

## 2024-12-03 PROCEDURE — 99214 OFFICE O/P EST MOD 30 MIN: CPT | Performed by: NURSE PRACTITIONER

## 2024-12-03 NOTE — PROGRESS NOTES
significant valvular aortic stenosis.   Indeterminate diastolic function.   The systolic pulmonary artery pressure cannot be estimated due to insufficient   tricuspid regurgitation.   IVC size is normal.   No pericardial effusion.     Hx CVA 2020   Speech improved     ZINA  Does not use CPAP   Discussed INSPIRE     Plan:   INSPIRE consult   Blood work PTV   Fu in 6 months with EP     Patient seen and examined. Clinical notes reviewed. Telemetry reviewed / testing reviewed    Pertinent labs, diagnostic, device, and imaging results reviewed as a part of this visit  EKG TTE stress test: any LHC/RHC reviewed   Today I spent  21  minutes of face to face time discussing cardiac testing, meds diet and cardiology complaints     non care face to face in care of this pt. 9  minutes     If you haven't had a visit with a Cardiologist in a year please make your next visit with him.        Sofia LOWRY FNP CVNP   [facility]

## 2025-01-14 RX ORDER — DILTIAZEM HYDROCHLORIDE 360 MG/1
360 CAPSULE, EXTENDED RELEASE ORAL DAILY
Qty: 90 CAPSULE | Refills: 2 | Status: SHIPPED | OUTPATIENT
Start: 2025-01-14

## 2025-01-22 RX ORDER — PANTOPRAZOLE SODIUM 40 MG/1
40 TABLET, DELAYED RELEASE ORAL DAILY
Qty: 30 TABLET | Refills: 0 | Status: SHIPPED | OUTPATIENT
Start: 2025-01-22

## 2025-05-08 NOTE — PROGRESS NOTES
[facility]   Electrophysiology  Office Visit  Date: 6/3/2025    Chief Complaint   Patient presents with    Atrial Fibrillation    Atrial Flutter    Cerebrovascular Accident     Cardiac HX: Rakan Anne is a 40 y.o. man with a h/o EtOH abuse, ZINA - untx'd, CVA (3/2020) w/ new onset AF, placed on Eliquis, flecainide, dilt, echo (3/2020) EF 50%, 13 day ZIO (4/2020) 38% AF/AFL/RVR, s/p RFA/PVI of AF (5/2020, Dr. Estrada), 14-day CAM (8/2020) w/ no AF, on Eliquis, dilt, recurrent AF/AFL (7/2023), scheduled for MALENA/DCCV, converted on his own.     Interval History/HPI: Patient is here for follow-up for paroxysmal atrial fibrillation.  Patient was originally seen in March 2020 when he presented with a CVA and was found to be in atrial fibrillation.  He had an echo that showed an EF of 50% with mild left atrial enlargement.  He was placed on flecainide, diltiazem and Eliquis.  He underwent an RFA/PVI of AF in May 2020.  Postprocedure he wore an outpatient monitor that showed no evidence of AF.  His flecainide was discontinued and he was continued on Eliquis and diltiazem.  He had been seen in the office in March 2023 and was found to be back in AF/AFL.  He admitted that he was back to drinking alcohol again.  He also works in a factory and admitted that it was very hot and felt like he probably was dehydrated.  We had increased his diltiazem to 180 mg daily.  He was scheduled for a MALENA/DCCV however he converted to normal sinus rhythm on his own.  Today he presents in NSR. He has not had any breakthrough of his atrial fib.  He denies heart racing, palpitations or irregular heartbeats.  He does have a Qranio mobile and is checked his heart rate a couple times when he felt something funny and it did not record any atrial fibrillation.  He remains on Eliquis 5 mg twice a day with no issues with bleeding or dark tarry stools.  He has missed a couple doses.  He states that he will occasionally miss the evening dose.  He

## 2025-05-28 RX ORDER — PANTOPRAZOLE SODIUM 40 MG/1
40 TABLET, DELAYED RELEASE ORAL DAILY
Qty: 30 TABLET | Refills: 0 | Status: SHIPPED | OUTPATIENT
Start: 2025-05-28

## 2025-05-28 RX ORDER — DILTIAZEM HYDROCHLORIDE 360 MG/1
360 CAPSULE, EXTENDED RELEASE ORAL DAILY
Qty: 90 CAPSULE | Refills: 2 | Status: SHIPPED | OUTPATIENT
Start: 2025-05-28

## 2025-06-03 ENCOUNTER — OFFICE VISIT (OUTPATIENT)
Dept: CARDIOLOGY CLINIC | Age: 40
End: 2025-06-03
Payer: COMMERCIAL

## 2025-06-03 VITALS
BODY MASS INDEX: 31.87 KG/M2 | HEART RATE: 70 BPM | DIASTOLIC BLOOD PRESSURE: 96 MMHG | WEIGHT: 235 LBS | SYSTOLIC BLOOD PRESSURE: 130 MMHG

## 2025-06-03 DIAGNOSIS — I10 BENIGN ESSENTIAL HTN: ICD-10-CM

## 2025-06-03 DIAGNOSIS — Z79.01 ON CONTINUOUS ORAL ANTICOAGULATION: ICD-10-CM

## 2025-06-03 DIAGNOSIS — I48.0 PAROXYSMAL ATRIAL FIBRILLATION (HCC): Primary | ICD-10-CM

## 2025-06-03 DIAGNOSIS — I48.92 PAROXYSMAL ATRIAL FLUTTER (HCC): ICD-10-CM

## 2025-06-03 PROCEDURE — 3075F SYST BP GE 130 - 139MM HG: CPT | Performed by: NURSE PRACTITIONER

## 2025-06-03 PROCEDURE — 93000 ELECTROCARDIOGRAM COMPLETE: CPT | Performed by: NURSE PRACTITIONER

## 2025-06-03 PROCEDURE — G8417 CALC BMI ABV UP PARAM F/U: HCPCS | Performed by: NURSE PRACTITIONER

## 2025-06-03 PROCEDURE — 4004F PT TOBACCO SCREEN RCVD TLK: CPT | Performed by: NURSE PRACTITIONER

## 2025-06-03 PROCEDURE — 99215 OFFICE O/P EST HI 40 MIN: CPT | Performed by: NURSE PRACTITIONER

## 2025-06-03 PROCEDURE — G8427 DOCREV CUR MEDS BY ELIG CLIN: HCPCS | Performed by: NURSE PRACTITIONER

## 2025-06-03 PROCEDURE — 3080F DIAST BP >= 90 MM HG: CPT | Performed by: NURSE PRACTITIONER

## 2025-06-03 RX ORDER — OLMESARTAN MEDOXOMIL 20 MG/1
20 TABLET ORAL DAILY
Qty: 30 TABLET | Refills: 5 | Status: SHIPPED | OUTPATIENT
Start: 2025-06-03

## 2025-06-03 RX ORDER — QUETIAPINE FUMARATE 50 MG/1
50 TABLET, FILM COATED ORAL PRN
COMMUNITY
Start: 2025-03-23

## 2025-06-03 RX ORDER — DESONIDE 0.5 MG/G
OINTMENT TOPICAL PRN
COMMUNITY
Start: 2025-04-22

## 2025-08-29 RX ORDER — PANTOPRAZOLE SODIUM 40 MG/1
40 TABLET, DELAYED RELEASE ORAL DAILY
Qty: 30 TABLET | Refills: 0 | Status: SHIPPED | OUTPATIENT
Start: 2025-08-29